# Patient Record
Sex: MALE | Race: ASIAN | NOT HISPANIC OR LATINO | Employment: OTHER | URBAN - METROPOLITAN AREA
[De-identification: names, ages, dates, MRNs, and addresses within clinical notes are randomized per-mention and may not be internally consistent; named-entity substitution may affect disease eponyms.]

---

## 2018-01-02 ENCOUNTER — OFFICE VISIT (OUTPATIENT)
Dept: URGENT CARE | Facility: CLINIC | Age: 73
End: 2018-01-02
Payer: MEDICARE

## 2018-01-02 VITALS
HEIGHT: 66 IN | TEMPERATURE: 97 F | SYSTOLIC BLOOD PRESSURE: 138 MMHG | BODY MASS INDEX: 20.89 KG/M2 | WEIGHT: 130 LBS | OXYGEN SATURATION: 97 % | DIASTOLIC BLOOD PRESSURE: 88 MMHG | HEART RATE: 76 BPM

## 2018-01-02 DIAGNOSIS — J06.9 UPPER RESPIRATORY TRACT INFECTION, UNSPECIFIED TYPE: ICD-10-CM

## 2018-01-02 DIAGNOSIS — R52 BODY ACHES: Primary | ICD-10-CM

## 2018-01-02 LAB
CTP QC/QA: YES
FLUAV AG NPH QL: NEGATIVE
FLUBV AG NPH QL: NEGATIVE

## 2018-01-02 PROCEDURE — 99213 OFFICE O/P EST LOW 20 MIN: CPT | Mod: 25,S$GLB,, | Performed by: FAMILY MEDICINE

## 2018-01-02 PROCEDURE — 87804 INFLUENZA ASSAY W/OPTIC: CPT | Mod: QW,S$GLB,, | Performed by: FAMILY MEDICINE

## 2018-01-02 NOTE — PROGRESS NOTES
"Subjective:       Patient ID: Maggie Bowen is a 73 y.o. male.    Vitals:  height is 5' 6" (1.676 m) and weight is 59 kg (130 lb). His temperature is 97 °F (36.1 °C). His blood pressure is 138/88 and his pulse is 76. His oxygen saturation is 97%.     Chief Complaint: URI    72 yo male with 4 day history of malaise and cough. No fever. No headache at present. Cough is mostly nonproductive.  No colored drainage.  Nonsmoker. No history of asthma or reactive airways      URI    This is a new problem. The current episode started in the past 7 days. The problem has been unchanged. There has been no fever. Associated symptoms include congestion, coughing, ear pain, headaches and a sore throat. Pertinent negatives include no abdominal pain, chest pain, nausea or wheezing. Treatments tried: otc cold & flu. The treatment provided no relief.     Review of Systems   Constitution: Positive for malaise/fatigue. Negative for chills and fever.   HENT: Positive for congestion, ear pain and sore throat. Negative for hoarse voice.    Eyes: Negative for discharge and redness.   Cardiovascular: Negative for chest pain, dyspnea on exertion and leg swelling.        Chest congestion   Respiratory: Positive for cough. Negative for shortness of breath, sputum production and wheezing.    Musculoskeletal: Positive for myalgias.   Gastrointestinal: Negative for abdominal pain and nausea.   Neurological: Positive for headaches.       Objective:      Physical Exam   Constitutional: He is oriented to person, place, and time. He appears well-developed and well-nourished. He is cooperative.  Non-toxic appearance. He does not appear ill. No distress.   HENT:   Head: Normocephalic and atraumatic.   Right Ear: Hearing, tympanic membrane, external ear and ear canal normal.   Left Ear: Hearing, tympanic membrane, external ear and ear canal normal.   Nose: Nose normal. No mucosal edema, rhinorrhea or nasal deformity. No epistaxis. Right sinus exhibits no " maxillary sinus tenderness and no frontal sinus tenderness. Left sinus exhibits no maxillary sinus tenderness and no frontal sinus tenderness.   Mouth/Throat: Uvula is midline, oropharynx is clear and moist and mucous membranes are normal. No trismus in the jaw. Normal dentition. No uvula swelling. No posterior oropharyngeal erythema.   Eyes: Conjunctivae and lids are normal. No scleral icterus.   Sclera clear bilat   Neck: Trachea normal, full passive range of motion without pain and phonation normal. Neck supple.   Cardiovascular: Normal rate, regular rhythm, normal heart sounds, intact distal pulses and normal pulses.    Pulmonary/Chest: Effort normal and breath sounds normal. No respiratory distress. He has no wheezes. He has no rales.   Abdominal: Soft. Normal appearance and bowel sounds are normal. He exhibits no distension and no mass. There is no tenderness. There is no guarding.   Musculoskeletal: Normal range of motion. He exhibits no edema or deformity.   Neurological: He is alert and oriented to person, place, and time. He exhibits normal muscle tone. Coordination normal.   Skin: Skin is warm, dry and intact. He is not diaphoretic. No pallor.   Psychiatric: He has a normal mood and affect. His speech is normal and behavior is normal. Judgment and thought content normal. Cognition and memory are normal.   Nursing note and vitals reviewed.      Assessment:       1. Body aches    2. Upper respiratory tract infection, unspecified type        Plan:         Body aches  -     POCT Influenza A/B    Upper respiratory tract infection, unspecified type    Rest, Fluids, Contagion precautions. Plain Robitussin OTC  Reviewed no antibiotic indicated at this time.    Neg flu test.

## 2018-01-02 NOTE — PATIENT INSTRUCTIONS
B?nh ???ng Hô H?p Do Vi Rút Gây Nên Có Ho Khò Khè  [Uri, Viral Respiratory Illness, Adult, No Abx]  Quý v? b? B?nh ? ??ng Hô H?p ph?nTrên [Upper Respiratory Illness (URI)] do vi rút gây nên. B?nh này lây truy?n marivel m?t vài ngày ??u. Nó britany t?a marivel khôn g khí do c?n ho và h?t h?i ho?c uyen ti?p xúc tr?c ti?p (??ng vào ng??i b?nh và andrea ?ó ??ng vào m?t, m?i ho?c mi?ng c?a chính m ình). Khi keyshawn?m trùng gây nên keyshawn?u kích thích, các ???ng th? (air passages) có th? b? co th?t. ?i?u này gây nên ho kh ò khè và h ?i th? ng?n.    Ph?n l?n b?nh do vi rút gây ???c gi?i valeriy?t marivel v òng t? 7 ??n 10 ngày n?u ngh? ng?i và dùng các bi?n pháp ch?a tr? ??n gi?n t?i tata; m?c d?u ?ôi khi b?nh có th? kéo dài keyshawn?u tu?n. Tr? sinh s? không di?t ???c vi rút và th??ng không ???c cho dùng ?? ch?a tr? b?nh này.  Ch?m Sóc T?i Tata:  1) N?u các tri?u ch?ng tr?m tr?ng, hãy ngh? ng ?i t?i nhà marivel 2 ??n 3 ngày ??u. Khi qu ý v? ho?t ??ng tr? l?i, ??ng ?? cho quá m?t.  2) Tránh ti?p xúc v?i khói thu?c lá (c?a quý v? ho?c c?a ng ??i khác).  3) Quý v? có th? dùng acetaminophen (Tylenol) ho?c ibuprofen (Motrin, Advil) ?? ki?m ch? c?n s?t hay ?au, tr? khi ???c ch? ??nh m?t lo?i thu?c khác ?? ch?a tr?. [ L?UÝ : N?u quý v? b? b?nh joaquim ho?c th?n mãn tính ho?c t?ng b? loét juan t? (stomach ulcer) ho?c barrera?t austin?t ???ng tiêu hóa (GI bleeding), h ãy bàn v?i bác s? c?a quý v? tr ??c khi dùng các lo?i thu?c này.] (??ng juan gi? dùng aspirin cho b?t c? tr? nào d??i 18 tu?i b? khi b?nh kèm jhonathan c?n s?t. ?i?u này có th? gây t?n h? i tr?m tr?ng cho joaquim.)  4) Quý v ? có th? b? kém ?n, do ?ó m?t ch? ?? ?n nh? là t?t. Tránh ??ng cho m?t n??c b?ng cách u?ng t? 6 ??n 8 ly ch?t l?ng m?i ngày (n??c, n??c ng?t, n??c ép trái cây, trà, súp v.v). Dùng thêm ch?t l?ng s? giúp làm l?ng ch?t bài ti?t marivel m?i và ph?i.  5) Các lo ?i thu?c c?m bán không c?n toa s? không rút ng?n th?i meera b? b?nh nh?ng có th? có ích ??i v?i các tri?u ch?ng andrea ?ây: ho (Robitussin DM); viêm  h?ng (viên ng?m ho?c b?m phun Chloraseptic); ngh?t m?i và xoang (Actifed ho?c Sudafed). [L?U Ý: ??ng d ùng thu?c thông m?i n?u quý v? b? mcbride austin?t áp.]  Ti?p T?c Rommel Dõi  cùng v?i bác s? c?a quý v? ho?c rommel nh?ng gì ? ã ???c h??ng d?n n?u t ình tr?ng c?a quý v? không ? ? h?n marivel tu?n k? ti?p.  N?u x?y ra b?t c? ?i?u nào andrea ?ây hãy L?P T?C ?I?U TR? Y T?:  -- Ho có keyshawn?u ?àm có màu (d?ch nh?y) ho?c có máu marivel ?àm  -- ?au ng?c, h?i th? ng?n, th? kh ò khè ho?c khó th?  -- ?au ??u, m?t, c?, h?ng ho?c albert tr?m tr?ng  -- S?t trên 100.5º quá ba ngày  -- Không th? nu?t vì ?au h?ng  Date Last Reviewed: 9/13/2015  © 2741-2077 [employer]. [address] [place] [address], [place] [address]. All rights reserved. This information is not intended as a substitute for professional medical care. Always follow your healthcare professional's instructions.      Rest, Fluids, Contagion precautions.  Recheck if not improving or condition worsens.  Plain Robitussin OTC

## 2021-01-03 ENCOUNTER — HOSPITAL ENCOUNTER (EMERGENCY)
Facility: HOSPITAL | Age: 76
Discharge: HOME | End: 2021-01-03
Attending: EMERGENCY MEDICINE
Payer: MEDICARE

## 2021-01-03 VITALS
DIASTOLIC BLOOD PRESSURE: 99 MMHG | BODY MASS INDEX: 21.66 KG/M2 | SYSTOLIC BLOOD PRESSURE: 169 MMHG | TEMPERATURE: 98 F | RESPIRATION RATE: 19 BRPM | OXYGEN SATURATION: 96 % | WEIGHT: 130 LBS | HEIGHT: 65 IN | HEART RATE: 67 BPM

## 2021-01-03 DIAGNOSIS — R42 DIZZINESS: ICD-10-CM

## 2021-01-03 DIAGNOSIS — R11.2 NAUSEA AND VOMITING, INTRACTABILITY OF VOMITING NOT SPECIFIED, UNSPECIFIED VOMITING TYPE: Primary | ICD-10-CM

## 2021-01-03 DIAGNOSIS — S13.9XXA NECK SPRAIN, INITIAL ENCOUNTER: ICD-10-CM

## 2021-01-03 LAB
ALBUMIN SERPL-MCNC: 3.9 G/DL (ref 3.3–5.5)
ALBUMIN SERPL-MCNC: 4.1 G/DL (ref 3.3–5.5)
ALP SERPL-CCNC: 45 U/L (ref 42–141)
ALP SERPL-CCNC: 54 U/L (ref 42–141)
BILIRUB SERPL-MCNC: 0.9 MG/DL (ref 0.2–1.6)
BILIRUB SERPL-MCNC: 1 MG/DL (ref 0.2–1.6)
BILIRUBIN, POC UA: NEGATIVE
BLOOD, POC UA: NEGATIVE
BUN SERPL-MCNC: 12 MG/DL (ref 7–22)
CALCIUM SERPL-MCNC: 9.5 MG/DL (ref 8–10.3)
CHLORIDE SERPL-SCNC: 106 MMOL/L (ref 98–108)
CLARITY, POC UA: CLEAR
COLOR, POC UA: YELLOW
CREAT SERPL-MCNC: 0.8 MG/DL (ref 0.6–1.2)
CTP QC/QA: YES
GLUCOSE SERPL-MCNC: 111 MG/DL (ref 73–118)
GLUCOSE, POC UA: NEGATIVE
KETONES, POC UA: NEGATIVE
LEUKOCYTE EST, POC UA: NEGATIVE
NITRITE, POC UA: NEGATIVE
PH UR STRIP: 7.5 [PH]
POC ALT (SGPT): 43 U/L (ref 10–47)
POC ALT (SGPT): 47 U/L (ref 10–47)
POC AMYLASE: 72 U/L (ref 14–97)
POC AST (SGOT): 39 U/L (ref 11–38)
POC AST (SGOT): 40 U/L (ref 11–38)
POC GGT: 28 U/L (ref 5–65)
POC TCO2: 29 MMOL/L (ref 18–33)
POTASSIUM BLD-SCNC: 3.7 MMOL/L (ref 3.6–5.1)
PROTEIN, POC UA: NEGATIVE
PROTEIN, POC: 7.5 G/DL (ref 6.4–8.1)
PROTEIN, POC: 7.5 G/DL (ref 6.4–8.1)
SARS-COV-2 RDRP RESP QL NAA+PROBE: NEGATIVE
SODIUM BLD-SCNC: 146 MMOL/L (ref 128–145)
SPECIFIC GRAVITY, POC UA: 1.02
UROBILINOGEN, POC UA: 0.2 E.U./DL

## 2021-01-03 PROCEDURE — 63600175 PHARM REV CODE 636 W HCPCS: Mod: ER | Performed by: NURSE PRACTITIONER

## 2021-01-03 PROCEDURE — 80053 COMPREHEN METABOLIC PANEL: CPT | Mod: ER

## 2021-01-03 PROCEDURE — 99285 EMERGENCY DEPT VISIT HI MDM: CPT | Mod: 25,ER

## 2021-01-03 PROCEDURE — 85025 COMPLETE CBC W/AUTO DIFF WBC: CPT | Mod: ER

## 2021-01-03 PROCEDURE — 81003 URINALYSIS AUTO W/O SCOPE: CPT | Mod: ER

## 2021-01-03 PROCEDURE — 96361 HYDRATE IV INFUSION ADD-ON: CPT | Mod: ER

## 2021-01-03 PROCEDURE — U0002 COVID-19 LAB TEST NON-CDC: HCPCS | Mod: ER | Performed by: NURSE PRACTITIONER

## 2021-01-03 PROCEDURE — 25000003 PHARM REV CODE 250: Mod: ER | Performed by: NURSE PRACTITIONER

## 2021-01-03 PROCEDURE — 96374 THER/PROPH/DIAG INJ IV PUSH: CPT | Mod: ER

## 2021-01-03 PROCEDURE — 82150 ASSAY OF AMYLASE: CPT | Mod: ER

## 2021-01-03 PROCEDURE — 96376 TX/PRO/DX INJ SAME DRUG ADON: CPT | Mod: ER

## 2021-01-03 RX ORDER — ONDANSETRON 4 MG/1
4 TABLET, ORALLY DISINTEGRATING ORAL EVERY 8 HOURS PRN
Qty: 30 TABLET | Refills: 0 | Status: SHIPPED | OUTPATIENT
Start: 2021-01-03

## 2021-01-03 RX ORDER — ACETAMINOPHEN 500 MG
1000 TABLET ORAL EVERY 6 HOURS PRN
Qty: 30 TABLET | Refills: 0 | Status: SHIPPED | OUTPATIENT
Start: 2021-01-03

## 2021-01-03 RX ORDER — SODIUM CHLORIDE 9 MG/ML
INJECTION, SOLUTION INTRAVENOUS
Status: COMPLETED | OUTPATIENT
Start: 2021-01-03 | End: 2021-01-03

## 2021-01-03 RX ORDER — ONDANSETRON 2 MG/ML
4 INJECTION INTRAMUSCULAR; INTRAVENOUS
Status: COMPLETED | OUTPATIENT
Start: 2021-01-03 | End: 2021-01-03

## 2021-01-03 RX ORDER — ACETAMINOPHEN 325 MG/1
650 TABLET ORAL ONCE
Status: COMPLETED | OUTPATIENT
Start: 2021-01-03 | End: 2021-01-03

## 2021-01-03 RX ADMIN — SODIUM CHLORIDE 500 ML/HR: 0.9 INJECTION, SOLUTION INTRAVENOUS at 07:01

## 2021-01-03 RX ADMIN — ONDANSETRON 4 MG: 2 INJECTION INTRAMUSCULAR; INTRAVENOUS at 05:01

## 2021-01-03 RX ADMIN — SODIUM CHLORIDE 500 ML/HR: 0.9 INJECTION, SOLUTION INTRAVENOUS at 04:01

## 2021-01-03 RX ADMIN — ACETAMINOPHEN 650 MG: 325 TABLET ORAL at 06:01

## 2021-01-03 RX ADMIN — ONDANSETRON 4 MG: 2 INJECTION INTRAMUSCULAR; INTRAVENOUS at 03:01

## 2021-01-22 ENCOUNTER — IMMUNIZATION (OUTPATIENT)
Dept: PHARMACY | Facility: CLINIC | Age: 76
End: 2021-01-22

## 2021-01-22 DIAGNOSIS — Z23 NEED FOR VACCINATION: Primary | ICD-10-CM

## 2021-02-19 ENCOUNTER — IMMUNIZATION (OUTPATIENT)
Dept: PHARMACY | Facility: CLINIC | Age: 76
End: 2021-02-19

## 2021-02-19 DIAGNOSIS — Z23 NEED FOR VACCINATION: Primary | ICD-10-CM

## 2024-09-03 ENCOUNTER — HOSPITAL ENCOUNTER (OUTPATIENT)
Facility: HOSPITAL | Age: 79
Discharge: HOME | End: 2024-09-04
Attending: EMERGENCY MEDICINE | Admitting: INTERNAL MEDICINE
Payer: MEDICARE

## 2024-09-03 DIAGNOSIS — R29.818 ACUTE FOCAL NEUROLOGICAL DEFICIT: ICD-10-CM

## 2024-09-03 DIAGNOSIS — I10 ESSENTIAL HYPERTENSION: ICD-10-CM

## 2024-09-03 DIAGNOSIS — R93.0 ABNORMAL CT SCAN OF HEAD: ICD-10-CM

## 2024-09-03 DIAGNOSIS — R90.89 ABNORMAL CT OF BRAIN: ICD-10-CM

## 2024-09-03 DIAGNOSIS — R42 DIZZINESS: Primary | ICD-10-CM

## 2024-09-03 LAB
ABO + RH BLD: NORMAL
ALBUMIN SERPL-MCNC: 3.7 G/DL (ref 3.3–5.5)
ALLENS TEST: ABNORMAL
ALP SERPL-CCNC: 53 U/L (ref 42–141)
ANION GAP SERPL CALC-SCNC: 10 MMOL/L (ref 8–16)
APTT PPP: 29.2 SEC (ref 21–32)
BASOPHILS # BLD AUTO: 0.05 K/UL (ref 0–0.2)
BASOPHILS NFR BLD: 0.6 % (ref 0–1.9)
BILIRUB SERPL-MCNC: 0.8 MG/DL (ref 0.2–1.6)
BLD GP AB SCN CELLS X3 SERPL QL: NORMAL
BUN SERPL-MCNC: 9 MG/DL (ref 7–22)
BUN SERPL-MCNC: 9 MG/DL (ref 8–23)
CALCIUM SERPL-MCNC: 9.3 MG/DL (ref 8–10.3)
CALCIUM SERPL-MCNC: 9.5 MG/DL (ref 8.7–10.5)
CHLORIDE SERPL-SCNC: 106 MMOL/L (ref 95–110)
CHLORIDE SERPL-SCNC: 108 MMOL/L (ref 98–108)
CO2 SERPL-SCNC: 23 MMOL/L (ref 23–29)
CREAT SERPL-MCNC: 0.8 MG/DL (ref 0.5–1.4)
CREAT SERPL-MCNC: 0.8 MG/DL (ref 0.6–1.2)
DIFFERENTIAL METHOD BLD: NORMAL
EOSINOPHIL # BLD AUTO: 0.1 K/UL (ref 0–0.5)
EOSINOPHIL NFR BLD: 1.2 % (ref 0–8)
ERYTHROCYTE [DISTWIDTH] IN BLOOD BY AUTOMATED COUNT: 13.1 % (ref 11.5–14.5)
EST. GFR  (NO RACE VARIABLE): >60 ML/MIN/1.73 M^2
GLUCOSE SERPL-MCNC: 108 MG/DL (ref 70–110)
GLUCOSE SERPL-MCNC: 114 MG/DL (ref 73–118)
HCO3 UR-SCNC: 26.3 MMOL/L (ref 24–28)
HCT VFR BLD AUTO: 49 % (ref 40–54)
HCT, POC: NORMAL
HGB BLD-MCNC: 16.8 G/DL (ref 14–18)
HGB, POC: NORMAL (ref 14–18)
IMM GRANULOCYTES # BLD AUTO: 0.02 K/UL (ref 0–0.04)
IMM GRANULOCYTES NFR BLD AUTO: 0.2 % (ref 0–0.5)
INR PPP: 1 (ref 0.8–1.2)
LDH SERPL L TO P-CCNC: 1.1 MMOL/L (ref 0.5–2.2)
LYMPHOCYTES # BLD AUTO: 3.4 K/UL (ref 1–4.8)
LYMPHOCYTES NFR BLD: 38.8 % (ref 18–48)
MAGNESIUM SERPL-MCNC: 2.3 MG/DL (ref 1.6–2.6)
MCH RBC QN AUTO: 30.7 PG (ref 27–31)
MCH, POC: NORMAL
MCHC RBC AUTO-ENTMCNC: 34.3 G/DL (ref 32–36)
MCHC, POC: NORMAL
MCV RBC AUTO: 89 FL (ref 82–98)
MCV, POC: NORMAL
MONOCYTES # BLD AUTO: 0.7 K/UL (ref 0.3–1)
MONOCYTES NFR BLD: 8.1 % (ref 4–15)
MPV, POC: NORMAL
NEUTROPHILS # BLD AUTO: 4.5 K/UL (ref 1.8–7.7)
NEUTROPHILS NFR BLD: 51.1 % (ref 38–73)
NRBC BLD-RTO: 0 /100 WBC
OHS QRS DURATION: 132 MS
OHS QTC CALCULATION: 401 MS
PCO2 BLDA: 42.9 MMHG (ref 35–45)
PH SMN: 7.39 [PH] (ref 7.35–7.45)
PHOSPHATE SERPL-MCNC: 2.6 MG/DL (ref 2.7–4.5)
PLATELET # BLD AUTO: 252 K/UL (ref 150–450)
PMV BLD AUTO: 9.2 FL (ref 9.2–12.9)
PO2 BLDA: 76 MMHG (ref 40–60)
POC ALT (SGPT): 30 U/L (ref 10–47)
POC AST (SGOT): 34 U/L (ref 11–38)
POC BE: 1 MMOL/L
POC PLATELET COUNT: NORMAL
POC PTINR: 1.1 (ref 0.9–1.2)
POC PTWBT: 13.1 SEC (ref 9.7–14.3)
POC SATURATED O2: 95 % (ref 95–100)
POC TCO2: 28 MMOL/L (ref 24–29)
POC TCO2: 29 MMOL/L (ref 18–33)
POCT GLUCOSE: 109 MG/DL (ref 70–110)
POTASSIUM BLD-SCNC: 4.1 MMOL/L (ref 3.6–5.1)
POTASSIUM SERPL-SCNC: 3.7 MMOL/L (ref 3.5–5.1)
PROTEIN, POC: 8 G/DL (ref 6.4–8.1)
PROTHROMBIN TIME: 10.5 SEC (ref 9–12.5)
RBC # BLD AUTO: 5.48 M/UL (ref 4.6–6.2)
RBC, POC: NORMAL
RDW, POC: NORMAL
SAMPLE: ABNORMAL
SAMPLE: NORMAL
SITE: ABNORMAL
SODIUM BLD-SCNC: 143 MMOL/L (ref 128–145)
SODIUM SERPL-SCNC: 139 MMOL/L (ref 136–145)
SPECIMEN OUTDATE: NORMAL
TSH SERPL DL<=0.005 MIU/L-ACNC: 0.52 UIU/ML (ref 0.4–4)
WBC # BLD AUTO: 8.84 K/UL (ref 3.9–12.7)
WBC, POC: NORMAL

## 2024-09-03 PROCEDURE — 85610 PROTHROMBIN TIME: CPT | Performed by: INTERNAL MEDICINE

## 2024-09-03 PROCEDURE — 86850 RBC ANTIBODY SCREEN: CPT | Performed by: INTERNAL MEDICINE

## 2024-09-03 PROCEDURE — 83735 ASSAY OF MAGNESIUM: CPT | Performed by: INTERNAL MEDICINE

## 2024-09-03 PROCEDURE — 86900 BLOOD TYPING SEROLOGIC ABO: CPT | Performed by: INTERNAL MEDICINE

## 2024-09-03 PROCEDURE — 93010 ELECTROCARDIOGRAM REPORT: CPT | Mod: ,,, | Performed by: INTERNAL MEDICINE

## 2024-09-03 PROCEDURE — 36415 COLL VENOUS BLD VENIPUNCTURE: CPT | Performed by: INTERNAL MEDICINE

## 2024-09-03 PROCEDURE — G0378 HOSPITAL OBSERVATION PER HR: HCPCS | Mod: ER

## 2024-09-03 PROCEDURE — 84443 ASSAY THYROID STIM HORMONE: CPT | Performed by: INTERNAL MEDICINE

## 2024-09-03 PROCEDURE — 93005 ELECTROCARDIOGRAM TRACING: CPT | Mod: ER

## 2024-09-03 PROCEDURE — 84100 ASSAY OF PHOSPHORUS: CPT | Performed by: INTERNAL MEDICINE

## 2024-09-03 PROCEDURE — 85730 THROMBOPLASTIN TIME PARTIAL: CPT | Performed by: INTERNAL MEDICINE

## 2024-09-03 PROCEDURE — 82803 BLOOD GASES ANY COMBINATION: CPT | Mod: ER

## 2024-09-03 PROCEDURE — 82607 VITAMIN B-12: CPT | Performed by: INTERNAL MEDICINE

## 2024-09-03 PROCEDURE — G0378 HOSPITAL OBSERVATION PER HR: HCPCS

## 2024-09-03 PROCEDURE — 85025 COMPLETE CBC W/AUTO DIFF WBC: CPT | Performed by: INTERNAL MEDICINE

## 2024-09-03 PROCEDURE — 25000003 PHARM REV CODE 250: Mod: ER | Performed by: EMERGENCY MEDICINE

## 2024-09-03 PROCEDURE — 80048 BASIC METABOLIC PNL TOTAL CA: CPT | Performed by: INTERNAL MEDICINE

## 2024-09-03 RX ORDER — ASPIRIN 81 MG/1
81 TABLET ORAL DAILY
Status: DISCONTINUED | OUTPATIENT
Start: 2024-09-04 | End: 2024-09-04 | Stop reason: HOSPADM

## 2024-09-03 RX ORDER — SODIUM CHLORIDE 0.9 % (FLUSH) 0.9 %
10 SYRINGE (ML) INJECTION
Status: DISCONTINUED | OUTPATIENT
Start: 2024-09-03 | End: 2024-09-04 | Stop reason: HOSPADM

## 2024-09-03 RX ORDER — GADOBUTROL 604.72 MG/ML
6 INJECTION INTRAVENOUS
Status: DISCONTINUED | OUTPATIENT
Start: 2024-09-04 | End: 2024-09-03

## 2024-09-03 RX ORDER — LABETALOL HYDROCHLORIDE 5 MG/ML
10 INJECTION, SOLUTION INTRAVENOUS
Status: DISCONTINUED | OUTPATIENT
Start: 2024-09-03 | End: 2024-09-04 | Stop reason: HOSPADM

## 2024-09-03 RX ORDER — ATORVASTATIN CALCIUM 40 MG/1
40 TABLET, FILM COATED ORAL NIGHTLY
Status: DISCONTINUED | OUTPATIENT
Start: 2024-09-03 | End: 2024-09-04 | Stop reason: HOSPADM

## 2024-09-03 RX ORDER — PANTOPRAZOLE SODIUM 40 MG/1
40 TABLET, DELAYED RELEASE ORAL DAILY
Status: DISCONTINUED | OUTPATIENT
Start: 2024-09-04 | End: 2024-09-04 | Stop reason: HOSPADM

## 2024-09-03 RX ORDER — BISACODYL 10 MG/1
10 SUPPOSITORY RECTAL DAILY PRN
Status: DISCONTINUED | OUTPATIENT
Start: 2024-09-03 | End: 2024-09-04 | Stop reason: HOSPADM

## 2024-09-03 RX ORDER — ONDANSETRON HYDROCHLORIDE 2 MG/ML
4 INJECTION, SOLUTION INTRAVENOUS EVERY 6 HOURS PRN
Status: DISCONTINUED | OUTPATIENT
Start: 2024-09-03 | End: 2024-09-04 | Stop reason: HOSPADM

## 2024-09-03 RX ORDER — POLYETHYLENE GLYCOL 3350 17 G/17G
17 POWDER, FOR SOLUTION ORAL DAILY
Status: DISCONTINUED | OUTPATIENT
Start: 2024-09-04 | End: 2024-09-04 | Stop reason: HOSPADM

## 2024-09-03 RX ORDER — ASPIRIN 325 MG
325 TABLET ORAL
Status: COMPLETED | OUTPATIENT
Start: 2024-09-03 | End: 2024-09-03

## 2024-09-03 RX ADMIN — ASPIRIN 325 MG ORAL TABLET 325 MG: 325 PILL ORAL at 03:09

## 2024-09-03 NOTE — SUBJECTIVE & OBJECTIVE
HPI:  79 y.o. male feeling dizzy and can't walk straight.     Imaging personally reviewed & interpreted:  CT Brain: no evidence of early or subacute ischemic changes, intracerebral hemorrhage or hyperdense vessel signs  CTA Head & Neck: Not performed at time of consultation  Current Outpatient Medications   Medication Instructions    acetaminophen (TYLENOL) 1,000 mg, Oral, Every 6 hours PRN    amLODIPine (NORVASC) 5 mg, Oral, Daily    atorvastatin (LIPITOR) 20 mg, Oral, Daily    ibuprofen (ADVIL,MOTRIN) 400 mg, Oral, Every 6 hours PRN    metoclopramide HCl (REGLAN) 10 mg, Oral, Every 6 hours    omeprazole (PRILOSEC) 20 mg, Oral, Daily    ondansetron (ZOFRAN) 4 mg, Oral, Every 6 hours    ondansetron (ZOFRAN-ODT) 4 mg, Oral, Every 8 hours PRN     Past Medical History:   Diagnosis Date    Elevated cholesterol     on medication    Hypertension     Reflux     on medication       Assessment and plan:  Dizziness - question of unsteady gate. Patient w/ NIHSS 0, transferring and ambulating without difficulty. No clear focal signs consistent with stroke, although isolated vertigo / acute vestibular syndrome could be a sign of stroke - however this patient not exhibiting clear signs and symptoms of acute vestibular syndrome.  On exam he is nonfocal - not sure if symptoms have resolved. Patient only c/o head feeling heavy..  OK to start asa 81 until workup completed    Lytics recommendation: Thrombolytic therapy not recommended due to Suspected stroke mimic  and Mild Non-Disabling Symptoms  Thrombectomy recommendation: No; at this time symptoms not suggestive of large vessel occlusion  Placement recommendation: pending further studies

## 2024-09-03 NOTE — TELEMEDICINE CONSULT
"[facility]  Vascular Neurology  Comprehensive Stroke Center  TeleVascular Neurology Acute Consultation Note        Consult Information  Consult to Telemedicine - Acute Stroke  Consult performed by: Nigel Holt MD  Consult ordered by: Anu Sandhu MD          Consulting Provider: ANU SANDHU   Current Providers  No providers found    Patient Location:  [facility] EMERGENCY DEPARTMENT Emergency Department    Spoke hospital nurse at bedside with patient assisting consultant.  Patient information was obtained from patient and relative(s).       Stroke Documentation  Acute Stroke Times   Last Known Normal Date: 09/03/24  Last Known Normal Time: 1140  Stroke Team Called Time: 1338  Stroke Team Arrival Time: 1348  Thrombolytic Therapy Recommended: No  Thrombectomy Recommended: No    NIH Scale:  1a. Level of Consciousness: 0-->Alert, keenly responsive  1b. LOC Questions: 0-->Answers both questions correctly  1c. LOC Commands: 0-->Performs both tasks correctly  2. Best Gaze: 0-->Normal  3. Visual: 0-->No visual loss  4. Facial Palsy: 0-->Normal symmetrical movements  5a. Motor Arm, Left: 0-->No drift, limb holds 90 (or 45) degrees for full 10 secs  5b. Motor Arm, Right: 0-->No drift, limb holds 90 (or 45) degrees for full 10 secs  6a. Motor Leg, Left: 0-->No drift, leg holds 30 degree position for full 5 secs  6b. Motor Leg, Right: 0-->No drift, leg holds 30 degree position for full 5 secs  7. Limb Ataxia: 0-->Absent  8. Sensory: 0-->Normal, no sensory loss  9. Best Language: 0-->No aphasia, normal  10. Dysarthria: 0-->Normal  11. Extinction and Inattention (formerly Neglect): 0-->No abnormality  Total (NIH Stroke Scale): 0      Modified Meghna:    McDonald Coma Scale:     ABCD2 Score:    [id number] Score:    HAS -BLED Score:    ICH Score:    Hunt & Sharma Classification:      Blood pressure (!) 184/101, pulse 66, temperature 97.7 °F (36.5 °C), resp. rate 20, height 5' 3" (1.6 m), weight 56.7 kg (125 " lb), SpO2 98%.      In my opinion, this was a: Tier 1; VAN Stroke Assessment: Negative     Medical Decision Making  HPI:  79 y.o. male feeling dizzy and can't walk straight.     Imaging personally reviewed & interpreted:  CT Brain: no evidence of early or subacute ischemic changes, intracerebral hemorrhage or hyperdense vessel signs  CTA Head & Neck: Not performed at time of consultation  Current Outpatient Medications   Medication Instructions    acetaminophen (TYLENOL) 1,000 mg, Oral, Every 6 hours PRN    amLODIPine (NORVASC) 5 mg, Oral, Daily    atorvastatin (LIPITOR) 20 mg, Oral, Daily    ibuprofen (ADVIL,MOTRIN) 400 mg, Oral, Every 6 hours PRN    metoclopramide HCl (REGLAN) 10 mg, Oral, Every 6 hours    omeprazole (PRILOSEC) 20 mg, Oral, Daily    ondansetron (ZOFRAN) 4 mg, Oral, Every 6 hours    ondansetron (ZOFRAN-ODT) 4 mg, Oral, Every 8 hours PRN     Past Medical History:   Diagnosis Date    Elevated cholesterol     on medication    Hypertension     Reflux     on medication       Assessment and plan:  Dizziness - question of unsteady gate. Patient w/ NIHSS 0, transferring and ambulating without difficulty. No clear focal signs consistent with stroke, although isolated vertigo / acute vestibular syndrome could be a sign of stroke - however this patient not exhibiting clear signs and symptoms of acute vestibular syndrome.  On exam he is nonfocal - not sure if symptoms have resolved. Patient only c/o head feeling heavy..  OK to start asa 81 until workup completed    Lytics recommendation: Thrombolytic therapy not recommended due to Suspected stroke mimic  and Mild Non-Disabling Symptoms  Thrombectomy recommendation: No; at this time symptoms not suggestive of large vessel occlusion  Placement recommendation: pending further studies               ROS  Physical Exam  Past Medical History:   Diagnosis Date    Elevated cholesterol     on medication    Hypertension     Reflux     on medication     Past Surgical  History:   Procedure Laterality Date    TONGUE BIOPSY  Nov. 2014    (at [facility])     No family history on file.    Diagnoses  Problem Noted   Dizziness 9/3/2024       Nigel Holt MD      Emergent/Acute neurological consultation requested by spoke provider due to critical concerns for possible cerebrovascular event that could result in permanent loss of neurologic/bodily function, severe disability or death of this patient.  Immediate/timely evaluation by a highly prepared expert is paramount for optimal outcomes  High risk for neurological deterioration if not properly diagnosed  High risk for neurological deterioration if not treated promplty/as soon as possible  Complex diagnostic evaluation may be required (advanced imaging)  High risk treatment options (thrombolytics and/or thrombectomy)    Patient care was coordinated with spoke provider, including but not limted to    Discussing likely diagnosis/etiology of symptoms  Making recommendations for further diagnostic studies  Making recommendations for intravenous thrombolytics or other advanced therapies  Making recommendations for disposition (admission/transfer for higher level of care)

## 2024-09-03 NOTE — ED TRIAGE NOTES
Pt came to the er c/o a headache and dizziness x3 hours. Pt denies numbness, tingling, slurred speech, facial droop. Pt allegedly had an unsteady gait at home but does not at this time

## 2024-09-04 VITALS
HEIGHT: 63 IN | BODY MASS INDEX: 22.81 KG/M2 | DIASTOLIC BLOOD PRESSURE: 73 MMHG | OXYGEN SATURATION: 95 % | WEIGHT: 128.75 LBS | SYSTOLIC BLOOD PRESSURE: 118 MMHG | HEART RATE: 69 BPM | TEMPERATURE: 99 F | RESPIRATION RATE: 18 BRPM

## 2024-09-04 PROBLEM — I10 ESSENTIAL HYPERTENSION: Status: ACTIVE | Noted: 2024-09-04

## 2024-09-04 PROBLEM — R93.0 ABNORMAL CT SCAN OF HEAD: Status: ACTIVE | Noted: 2024-09-04

## 2024-09-04 PROBLEM — E78.49 OTHER HYPERLIPIDEMIA: Status: ACTIVE | Noted: 2024-09-04

## 2024-09-04 PROBLEM — R73.03 PRE-DIABETES: Status: ACTIVE | Noted: 2024-09-04

## 2024-09-04 LAB
ANION GAP SERPL CALC-SCNC: 8 MMOL/L (ref 8–16)
AORTIC ROOT ANNULUS: 3.6 CM
AORTIC VALVE CUSP SEPERATION: 2.02 CM
ASCENDING AORTA: 3.4 CM
AV INDEX (PROSTH): 0.98
AV MEAN GRADIENT: 2 MMHG
AV PEAK GRADIENT: 4 MMHG
AV VALVE AREA BY VELOCITY RATIO: 3.93 CM²
AV VALVE AREA: 4.26 CM²
AV VELOCITY RATIO: 0.91
BASOPHILS # BLD AUTO: 0.06 K/UL (ref 0–0.2)
BASOPHILS NFR BLD: 0.7 % (ref 0–1.9)
BSA FOR ECHO PROCEDURE: 1.61 M2
BUN SERPL-MCNC: 12 MG/DL (ref 8–23)
CALCIUM SERPL-MCNC: 9.3 MG/DL (ref 8.7–10.5)
CHLORIDE SERPL-SCNC: 106 MMOL/L (ref 95–110)
CO2 SERPL-SCNC: 26 MMOL/L (ref 23–29)
CREAT SERPL-MCNC: 0.8 MG/DL (ref 0.5–1.4)
CV ECHO LV RWT: 0.55 CM
DIFFERENTIAL METHOD BLD: ABNORMAL
DOP CALC AO PEAK VEL: 0.97 M/S
DOP CALC AO VTI: 17.8 CM
DOP CALC LVOT AREA: 4.3 CM2
DOP CALC LVOT DIAMETER: 2.35 CM
DOP CALC LVOT PEAK VEL: 0.88 M/S
DOP CALC LVOT STROKE VOLUME: 75.87 CM3
DOP CALCLVOT PEAK VEL VTI: 17.5 CM
E WAVE DECELERATION TIME: 276.78 MSEC
E/A RATIO: 0.7
E/E' RATIO: 9.57 M/S
ECHO LV POSTERIOR WALL: 0.93 CM (ref 0.6–1.1)
EOSINOPHIL # BLD AUTO: 0.1 K/UL (ref 0–0.5)
EOSINOPHIL NFR BLD: 1.6 % (ref 0–8)
ERYTHROCYTE [DISTWIDTH] IN BLOOD BY AUTOMATED COUNT: 13.2 % (ref 11.5–14.5)
EST. GFR  (NO RACE VARIABLE): >60 ML/MIN/1.73 M^2
ESTIMATED AVG GLUCOSE: 114 MG/DL (ref 68–131)
FRACTIONAL SHORTENING: 33 % (ref 28–44)
GLUCOSE SERPL-MCNC: 98 MG/DL (ref 70–110)
HBA1C MFR BLD: 5.6 % (ref 4–5.6)
HCT VFR BLD AUTO: 49.1 % (ref 40–54)
HGB BLD-MCNC: 16.3 G/DL (ref 14–18)
IMM GRANULOCYTES # BLD AUTO: 0.03 K/UL (ref 0–0.04)
IMM GRANULOCYTES NFR BLD AUTO: 0.4 % (ref 0–0.5)
INTERVENTRICULAR SEPTUM: 1.02 CM (ref 0.6–1.1)
IVC DIAMETER: 1.82 CM
IVRT: 125.59 MSEC
LA MAJOR: 3.92 CM
LA MINOR: 4.06 CM
LA WIDTH: 2.5 CM
LEFT ATRIUM SIZE: 2.71 CM
LEFT ATRIUM VOLUME INDEX: 14.4 ML/M2
LEFT ATRIUM VOLUME: 22.97 CM3
LEFT INTERNAL DIMENSION IN SYSTOLE: 2.27 CM (ref 2.1–4)
LEFT VENTRICLE DIASTOLIC VOLUME INDEX: 29.69 ML/M2
LEFT VENTRICLE DIASTOLIC VOLUME: 47.5 ML
LEFT VENTRICLE MASS INDEX: 60 G/M2
LEFT VENTRICLE SYSTOLIC VOLUME INDEX: 11 ML/M2
LEFT VENTRICLE SYSTOLIC VOLUME: 17.63 ML
LEFT VENTRICULAR INTERNAL DIMENSION IN DIASTOLE: 3.4 CM (ref 3.5–6)
LEFT VENTRICULAR MASS: 95.3 G
LV LATERAL E/E' RATIO: 8.38 M/S
LV SEPTAL E/E' RATIO: 11.17 M/S
LVED V (TEICH): 47.5 ML
LVES V (TEICH): 17.63 ML
LVOT MG: 1.81 MMHG
LVOT MV: 0.65 CM/S
LYMPHOCYTES # BLD AUTO: 2.8 K/UL (ref 1–4.8)
LYMPHOCYTES NFR BLD: 34.5 % (ref 18–48)
MCH RBC QN AUTO: 30.1 PG (ref 27–31)
MCHC RBC AUTO-ENTMCNC: 33.2 G/DL (ref 32–36)
MCV RBC AUTO: 91 FL (ref 82–98)
MONOCYTES # BLD AUTO: 0.8 K/UL (ref 0.3–1)
MONOCYTES NFR BLD: 10.1 % (ref 4–15)
MV PEAK A VEL: 0.96 M/S
MV PEAK E VEL: 0.67 M/S
MV STENOSIS PRESSURE HALF TIME: 80.27 MS
MV VALVE AREA P 1/2 METHOD: 2.74 CM2
NEUTROPHILS # BLD AUTO: 4.3 K/UL (ref 1.8–7.7)
NEUTROPHILS NFR BLD: 52.7 % (ref 38–73)
NRBC BLD-RTO: 0 /100 WBC
OHS CV RV/LV RATIO: 0.89 CM
PISA TR MAX VEL: 2.81 M/S
PLATELET # BLD AUTO: 225 K/UL (ref 150–450)
PMV BLD AUTO: 9 FL (ref 9.2–12.9)
POCT GLUCOSE: 109 MG/DL (ref 70–110)
POCT GLUCOSE: 111 MG/DL (ref 70–110)
POTASSIUM SERPL-SCNC: 4.3 MMOL/L (ref 3.5–5.1)
PULM VEIN S/D RATIO: 1.34
PV PEAK D VEL: 0.38 M/S
PV PEAK GRADIENT: 4 MMHG
PV PEAK S VEL: 0.51 M/S
PV PEAK VELOCITY: 0.98 M/S
RA MAJOR: 4.59 CM
RA PRESSURE ESTIMATED: 3 MMHG
RA WIDTH: 2.7 CM
RBC # BLD AUTO: 5.42 M/UL (ref 4.6–6.2)
RIGHT VENTRICULAR END-DIASTOLIC DIMENSION: 3.04 CM
RV TB RVSP: 6 MMHG
RV TISSUE DOPPLER FREE WALL SYSTOLIC VELOCITY 1 (APICAL 4 CHAMBER VIEW): 11.71 CM/S
SINUS: 3.68 CM
SODIUM SERPL-SCNC: 140 MMOL/L (ref 136–145)
STJ: 2.7 CM
TDI LATERAL: 0.08 M/S
TDI SEPTAL: 0.06 M/S
TDI: 0.07 M/S
TR MAX PG: 32 MMHG
TRICUSPID ANNULAR PLANE SYSTOLIC EXCURSION: 1.59 CM
TV REST PULMONARY ARTERY PRESSURE: 35 MMHG
VIT B12 SERPL-MCNC: 664 PG/ML (ref 210–950)
WBC # BLD AUTO: 8.21 K/UL (ref 3.9–12.7)
Z-SCORE OF LEFT VENTRICULAR DIMENSION IN END DIASTOLE: -2.92
Z-SCORE OF LEFT VENTRICULAR DIMENSION IN END SYSTOLE: -1.73

## 2024-09-04 PROCEDURE — 36415 COLL VENOUS BLD VENIPUNCTURE: CPT | Performed by: INTERNAL MEDICINE

## 2024-09-04 PROCEDURE — G0378 HOSPITAL OBSERVATION PER HR: HCPCS

## 2024-09-04 PROCEDURE — 85025 COMPLETE CBC W/AUTO DIFF WBC: CPT | Performed by: INTERNAL MEDICINE

## 2024-09-04 PROCEDURE — 80048 BASIC METABOLIC PNL TOTAL CA: CPT | Performed by: INTERNAL MEDICINE

## 2024-09-04 PROCEDURE — 99204 OFFICE O/P NEW MOD 45 MIN: CPT | Mod: ,,, | Performed by: OTOLARYNGOLOGY

## 2024-09-04 PROCEDURE — 25500020 PHARM REV CODE 255: Performed by: STUDENT IN AN ORGANIZED HEALTH CARE EDUCATION/TRAINING PROGRAM

## 2024-09-04 PROCEDURE — A9585 GADOBUTROL INJECTION: HCPCS | Performed by: STUDENT IN AN ORGANIZED HEALTH CARE EDUCATION/TRAINING PROGRAM

## 2024-09-04 PROCEDURE — 97165 OT EVAL LOW COMPLEX 30 MIN: CPT

## 2024-09-04 PROCEDURE — 25000003 PHARM REV CODE 250: Performed by: STUDENT IN AN ORGANIZED HEALTH CARE EDUCATION/TRAINING PROGRAM

## 2024-09-04 PROCEDURE — 25000003 PHARM REV CODE 250: Performed by: INTERNAL MEDICINE

## 2024-09-04 PROCEDURE — 63600175 PHARM REV CODE 636 W HCPCS: Performed by: INTERNAL MEDICINE

## 2024-09-04 PROCEDURE — 83036 HEMOGLOBIN GLYCOSYLATED A1C: CPT | Performed by: INTERNAL MEDICINE

## 2024-09-04 PROCEDURE — 97161 PT EVAL LOW COMPLEX 20 MIN: CPT

## 2024-09-04 RX ORDER — DIAZEPAM 5 MG/1
5 TABLET ORAL ONCE AS NEEDED
Status: COMPLETED | OUTPATIENT
Start: 2024-09-04 | End: 2024-09-04

## 2024-09-04 RX ORDER — ACETAMINOPHEN 325 MG/1
650 TABLET ORAL EVERY 6 HOURS PRN
Status: DISCONTINUED | OUTPATIENT
Start: 2024-09-04 | End: 2024-09-04 | Stop reason: HOSPADM

## 2024-09-04 RX ORDER — METOPROLOL TARTRATE 1 MG/ML
5 INJECTION, SOLUTION INTRAVENOUS ONCE
Status: DISCONTINUED | OUTPATIENT
Start: 2024-09-04 | End: 2024-09-04

## 2024-09-04 RX ORDER — BUTALBITAL, ACETAMINOPHEN AND CAFFEINE 50; 325; 40 MG/1; MG/1; MG/1
1 TABLET ORAL EVERY 6 HOURS PRN
Qty: 12 TABLET | Refills: 0 | Status: SHIPPED | OUTPATIENT
Start: 2024-09-04

## 2024-09-04 RX ORDER — GADOBUTROL 604.72 MG/ML
6 INJECTION INTRAVENOUS
Status: COMPLETED | OUTPATIENT
Start: 2024-09-04 | End: 2024-09-04

## 2024-09-04 RX ADMIN — GADOBUTROL 6 ML: 604.72 INJECTION INTRAVENOUS at 06:09

## 2024-09-04 RX ADMIN — ACETAMINOPHEN 650 MG: 325 TABLET ORAL at 12:09

## 2024-09-04 RX ADMIN — DIAZEPAM 5 MG: 5 TABLET ORAL at 04:09

## 2024-09-04 RX ADMIN — ASPIRIN 81 MG: 81 TABLET, COATED ORAL at 07:09

## 2024-09-04 RX ADMIN — ONDANSETRON 4 MG: 2 INJECTION INTRAMUSCULAR; INTRAVENOUS at 12:09

## 2024-09-04 RX ADMIN — PANTOPRAZOLE SODIUM 40 MG: 40 TABLET, DELAYED RELEASE ORAL at 07:09

## 2024-09-04 RX ADMIN — POTASSIUM PHOSPHATE, MONOBASIC 1000 MG: 500 TABLET, SOLUBLE ORAL at 12:09

## 2024-09-04 RX ADMIN — ATORVASTATIN CALCIUM 40 MG: 40 TABLET, FILM COATED ORAL at 12:09

## 2024-09-04 NOTE — HPI
"79 y.o. Kinyarwanda man with h/o Essential HTN, HLD, BPH, and pre-DM presents to the [facility] ED with h/o dizziness and NUGENT. States was fine yesterday but this am woke up with severe HA all over.  NO vision changes or focal neurological deficits. Family denies speech changes/impairments. NO facial droop. No c/o palpitations or chest pain/pressure. NO N/V/Diarrhea.     He was stoke activated with CT non-contrasted Impression:     1. No acute large vascular territory infarct or intracranial hemorrhage identified.  If persistent neurologic deficit, MRI brain can be obtained.  2. Similar changes of chronic microvascular ischemic disease with cerebral volume loss.  3. Right infratemporal fossa region plaque-like soft tissue density lesion as above, nonspecific.  Sequela of underlying malignancy including squamous cell carcinoma should be excluded.  Further evaluation/follow-up as warranted.  This report was flagged in Epic as containing an incidental finding.    Vascular neurology was consulted and per their recs:   "Dizziness - question of unsteady gate. Patient w/ NIHSS 0, transferring and ambulating without difficulty. No clear focal signs consistent with stroke, although isolated vertigo / acute vestibular syndrome could be a sign of stroke - however this patient not exhibiting clear signs and symptoms of acute vestibular syndrome."  Thrombolytics were not recommended due to suspected stroke mimic and Mild Non-Disabling Symptoms      He was transferred to [facility] for further work up of dizziness possible TIA.     Because this patient's clinical condition of dizziness is still persistent and requires further work up by neurology for dizzines and r/o CVA, care in an alternative location isn't clinically appropriate for the reasons stated above.     "

## 2024-09-04 NOTE — PLAN OF CARE
Problem: Physical Therapy  Goal: Physical Therapy Goal  Description: Goals to be met by: 24     Patient will increase functional independence with mobility by performin. Pt to be mod I with bed mobility.  2. Pt to transfer with (S).  3. Pt to ambulate 150' /s AD (S).  4. Pt to demonstrate good static/dynamic standing balance.    Outcome: Progressing   Initial eval completed, see in chart for details.

## 2024-09-04 NOTE — H&P
"  [facility]  History & Physical    Patient Name: Maggie Bowen  MRN: [id number]  Patient Class: OP- Observation  Admission Date: 9/3/2024  Attending Physician: Dr. Jazz Berg   Primary Care Provider: Clarice Keita MD         Patient information was obtained from patient, relative(s), past medical records, and ER records.     Subjective:     Principal Problem:Dizziness    Chief Complaint:   Chief Complaint   Patient presents with    Dizziness     Dizziness x3 hours. Also c/o headache. Pt's son reports dizzy spells in the past. Denies slurred speech, facial droop, weakness. Pt is in ct        HPI: 79 y.o. [place] man with h/o Essential HTN, HLD, BPH, and pre-DM presents to the [facility] ED with h/o dizziness and NUGENT. States was fine yesterday but this am woke up with severe HA all over.  NO vision changes or focal neurological deficits. Family denies speech changes/impairments. NO facial droop. No c/o palpitations or chest pain/pressure. NO N/V/Diarrhea.     He was stoke activated with CT non-contrasted Impression:     1. No acute large vascular territory infarct or intracranial hemorrhage identified.  If persistent neurologic deficit, MRI brain can be obtained.  2. Similar changes of chronic microvascular ischemic disease with cerebral volume loss.  3. Right infratemporal fossa region plaque-like soft tissue density lesion as above, nonspecific.  Sequela of underlying malignancy including squamous cell carcinoma should be excluded.  Further evaluation/follow-up as warranted.  This report was flagged in Epic as containing an incidental finding.    Vascular neurology was consulted and per their recs:   "Dizziness - question of unsteady gate. Patient w/ NIHSS 0, transferring and ambulating without difficulty. No clear focal signs consistent with stroke, although isolated vertigo / acute vestibular syndrome could be a sign of stroke - however this patient not exhibiting clear signs and " "symptoms of acute vestibular syndrome."  Thrombolytics were not recommended due to suspected stroke mimic and Mild Non-Disabling Symptoms      He was transferred to [facility] for further work up of dizziness possible TIA.     Because this patient's clinical condition of dizziness is still persistent and requires further work up by neurology for dizzines and r/o CVA, care in an alternative location isn't clinically appropriate for the reasons stated above.       Past Medical History:   Diagnosis Date    Elevated cholesterol     on medication    Hypertension     Reflux     on medication       Past Surgical History:   Procedure Laterality Date    TONGUE BIOPSY  Nov. 2014    (at [facility])       Review of patient's allergies indicates:   Allergen Reactions    Influenza virus vaccines        No current facility-administered medications on file prior to encounter.     Current Outpatient Medications on File Prior to Encounter   Medication Sig    acetaminophen (TYLENOL) 500 MG tablet Take 2 tablets (1,000 mg total) by mouth every 6 (six) hours as needed for Pain.    amlodipine (NORVASC) 5 MG tablet Take 5 mg by mouth once daily.    atorvastatin (LIPITOR) 20 MG tablet Take 20 mg by mouth once daily.    ibuprofen (ADVIL,MOTRIN) 200 MG tablet Take 2 tablets (400 mg total) by mouth every 6 (six) hours as needed for Pain.    metoclopramide HCl (REGLAN) 10 MG tablet Take 1 tablet (10 mg total) by mouth every 6 (six) hours.    omeprazole (PRILOSEC) 20 MG capsule Take 20 mg by mouth once daily.    ondansetron (ZOFRAN) 4 MG tablet Take 1 tablet (4 mg total) by mouth every 6 (six) hours.    ondansetron (ZOFRAN-ODT) 4 MG TbDL Take 1 tablet (4 mg total) by mouth every 8 (eight) hours as needed (nausea).     Family History    None       Tobacco Use    Smoking status: Former    Smokeless tobacco: Not on file   Substance and Sexual Activity    Alcohol use: No    Drug use: No    Sexual activity: Not on file     Review of Systems "   Constitutional:  Positive for activity change. Negative for appetite change (today due to dizziness and HA), chills, diaphoresis, fatigue, fever and unexpected weight change.   HENT:  Negative for congestion, rhinorrhea and sinus pressure.    Eyes:  Negative for visual disturbance.   Respiratory:  Negative for cough, chest tightness, shortness of breath and wheezing.    Cardiovascular:  Negative for chest pain, palpitations and leg swelling.   Gastrointestinal:  Positive for nausea (due to dizziness). Negative for abdominal pain, constipation, diarrhea and vomiting.   Genitourinary:  Negative for dysuria.   Musculoskeletal:  Negative for arthralgias, back pain, gait problem and myalgias.   Neurological:  Positive for dizziness, light-headedness and headaches. Negative for seizures, syncope and numbness.   Psychiatric/Behavioral:  Negative for confusion and dysphoric mood. The patient is not nervous/anxious.      Objective:     Vital Signs (Most Recent):  Temp: 97.7 °F (36.5 °C) (09/04/24 0335)  Pulse: 74 (09/04/24 0642)  Resp: 18 (09/04/24 0335)  BP: 111/76 (09/04/24 0335)  SpO2: 95 % (09/04/24 0500) Vital Signs (24h Range):  Temp:  [97.5 °F (36.4 °C)-98.2 °F (36.8 °C)] 97.7 °F (36.5 °C)  Pulse:  [58-74] 74  Resp:  [12-20] 18  SpO2:  [95 %-98 %] 95 %  BP: (111-184)/() 111/76     Weight: 58.4 kg (128 lb 12 oz)  Body mass index is 22.81 kg/m².     Physical Exam  Vitals and nursing note reviewed.   Constitutional:       General: He is not in acute distress.     Appearance: Normal appearance. He is not ill-appearing, toxic-appearing or diaphoretic.      Comments: Thin, lean appearing    HENT:      Head: Normocephalic and atraumatic.      Nose: Nose normal. No congestion or rhinorrhea.      Mouth/Throat:      Mouth: Mucous membranes are moist.      Pharynx: Oropharynx is clear. No oropharyngeal exudate or posterior oropharyngeal erythema.   Eyes:      General: No scleral icterus.     Extraocular Movements:  Extraocular movements intact.      Conjunctiva/sclera: Conjunctivae normal.      Pupils: Pupils are equal, round, and reactive to light.   Neck:      Vascular: No carotid bruit.   Cardiovascular:      Rate and Rhythm: Normal rate and regular rhythm.      Pulses: Normal pulses.      Heart sounds: No murmur heard.     No friction rub. No gallop.   Pulmonary:      Effort: Pulmonary effort is normal. No respiratory distress.      Breath sounds: No wheezing, rhonchi or rales.   Abdominal:      General: Abdomen is flat. There is no distension.      Palpations: Abdomen is soft.      Tenderness: There is no abdominal tenderness. There is no guarding or rebound.   Musculoskeletal:         General: No swelling. Normal range of motion.      Cervical back: Normal range of motion and neck supple.      Right lower leg: No edema.      Left lower leg: No edema.   Skin:     General: Skin is warm and dry.      Capillary Refill: Capillary refill takes less than 2 seconds.      Coloration: Skin is pale.   Neurological:      General: No focal deficit present.      Mental Status: He is alert and oriented to person, place, and time. Mental status is at baseline.      Cranial Nerves: No cranial nerve deficit.      Motor: No weakness.      Coordination: Coordination normal.   Psychiatric:         Mood and Affect: Mood normal.         Behavior: Behavior normal.              CRANIAL NERVES     CN III, IV, VI   Pupils are equal, round, and reactive to light.       Recent Results (from the past 24 hour(s))   POCT glucose    Collection Time: 09/03/24  1:39 PM   Result Value Ref Range    POCT Glucose 109 70 - 110 mg/dL   POCT CMP    Collection Time: 09/03/24  1:53 PM   Result Value Ref Range    Albumin, POC 3.7 3.3 - 5.5 g/dL    Alkaline Phosphatase, POC 53 42 - 141 U/L    ALT (SGPT), POC 30 10 - 47 U/L    AST (SGOT), POC 34 11 - 38 U/L    POC BUN 9 7 - 22 mg/dL    Calcium, POC 9.3 8.0 - 10.3 mg/dL    POC Chloride 108 98 - 108 mmol/L    POC  Creatinine 0.8 0.6 - 1.2 mg/dL    POC Glucose 114 73 - 118 mg/dL    POC Potassium 4.1 3.6 - 5.1 mmol/L    POC Sodium 143 128 - 145 mmol/L    Bilirubin, POC 0.8 0.2 - 1.6 mg/dL    POC TCO2 29 18 - 33 mmol/L    Protein, POC 8.0 6.4 - 8.1 g/dL   ECG 12 lead    Collection Time: 09/03/24  1:53 PM   Result Value Ref Range    QRS Duration 132 ms    [facility] QTC Calculation 401 ms   ISTAT PROCEDURE    Collection Time: 09/03/24  1:58 PM   Result Value Ref Range    POC PTWBT 13.1 9.7 - 14.3 sec    POC PTINR 1.1 0.9 - 1.2    Sample unknown    POCT CBC    Collection Time: 09/03/24  2:00 PM   Result Value Ref Range    Hematocrit      Hemoglobin      RBC      WBC      MCV      MCH, POC      MCHC      RDW-CV      Platelet Count, POC      MPV     ISTAT PROCEDURE    Collection Time: 09/03/24  2:00 PM   Result Value Ref Range    POC PH 7.395 7.35 - 7.45    POC PCO2 42.9 35 - 45 mmHg    POC PO2 76 (HH) 40 - 60 mmHg    POC HCO3 26.3 24 - 28 mmol/L    POC BE 1 -2 to 2 mmol/L    POC SATURATED O2 95 95 - 100 %    POC Lactate 1.10 0.5 - 2.2 mmol/L    POC TCO2 28 24 - 29 mmol/L    Sample VENOUS     Site Other     Allens Test N/A    Basic metabolic panel    Collection Time: 09/03/24  7:39 PM   Result Value Ref Range    Sodium 139 136 - 145 mmol/L    Potassium 3.7 3.5 - 5.1 mmol/L    Chloride 106 95 - 110 mmol/L    CO2 23 23 - 29 mmol/L    Glucose 108 70 - 110 mg/dL    BUN 9 8 - 23 mg/dL    Creatinine 0.8 0.5 - 1.4 mg/dL    Calcium 9.5 8.7 - 10.5 mg/dL    Anion Gap 10 8 - 16 mmol/L    eGFR >60 >60 mL/min/1.73 m^2   Magnesium    Collection Time: 09/03/24  7:39 PM   Result Value Ref Range    Magnesium 2.3 1.6 - 2.6 mg/dL   Phosphorus    Collection Time: 09/03/24  7:39 PM   Result Value Ref Range    Phosphorus 2.6 (L) 2.7 - 4.5 mg/dL   TSH    Collection Time: 09/03/24  7:39 PM   Result Value Ref Range    TSH 0.523 0.400 - 4.000 uIU/mL   Type & Screen    Collection Time: 09/03/24  7:39 PM   Result Value Ref Range    Group & Rh O POS     Indirect  Dima NEG     Specimen Outdate 09/06/2024 23:59    Protime-INR    Collection Time: 09/03/24  7:39 PM   Result Value Ref Range    Prothrombin Time 10.5 9.0 - 12.5 sec    INR 1.0 0.8 - 1.2   APTT    Collection Time: 09/03/24  7:39 PM   Result Value Ref Range    aPTT 29.2 21.0 - 32.0 sec   CBC Auto Differential    Collection Time: 09/03/24  7:39 PM   Result Value Ref Range    WBC 8.84 3.90 - 12.70 K/uL    RBC 5.48 4.60 - 6.20 M/uL    Hemoglobin 16.8 14.0 - 18.0 g/dL    Hematocrit 49.0 40.0 - 54.0 %    MCV 89 82 - 98 fL    MCH 30.7 27.0 - 31.0 pg    MCHC 34.3 32.0 - 36.0 g/dL    RDW 13.1 11.5 - 14.5 %    Platelets 252 150 - 450 K/uL    MPV 9.2 9.2 - 12.9 fL    Immature Granulocytes 0.2 0.0 - 0.5 %    Gran # (ANC) 4.5 1.8 - 7.7 K/uL    Immature Grans (Abs) 0.02 0.00 - 0.04 K/uL    Lymph # 3.4 1.0 - 4.8 K/uL    Mono # 0.7 0.3 - 1.0 K/uL    Eos # 0.1 0.0 - 0.5 K/uL    Baso # 0.05 0.00 - 0.20 K/uL    nRBC 0 0 /100 WBC    Gran % 51.1 38.0 - 73.0 %    Lymph % 38.8 18.0 - 48.0 %    Mono % 8.1 4.0 - 15.0 %    Eosinophil % 1.2 0.0 - 8.0 %    Basophil % 0.6 0.0 - 1.9 %    Differential Method Automated    Basic metabolic panel    Collection Time: 09/04/24  4:57 AM   Result Value Ref Range    Sodium 140 136 - 145 mmol/L    Potassium 4.3 3.5 - 5.1 mmol/L    Chloride 106 95 - 110 mmol/L    CO2 26 23 - 29 mmol/L    Glucose 98 70 - 110 mg/dL    BUN 12 8 - 23 mg/dL    Creatinine 0.8 0.5 - 1.4 mg/dL    Calcium 9.3 8.7 - 10.5 mg/dL    Anion Gap 8 8 - 16 mmol/L    eGFR >60 >60 mL/min/1.73 m^2   CBC Auto Differential    Collection Time: 09/04/24  4:57 AM   Result Value Ref Range    WBC 8.21 3.90 - 12.70 K/uL    RBC 5.42 4.60 - 6.20 M/uL    Hemoglobin 16.3 14.0 - 18.0 g/dL    Hematocrit 49.1 40.0 - 54.0 %    MCV 91 82 - 98 fL    MCH 30.1 27.0 - 31.0 pg    MCHC 33.2 32.0 - 36.0 g/dL    RDW 13.2 11.5 - 14.5 %    Platelets 225 150 - 450 K/uL    MPV 9.0 (L) 9.2 - 12.9 fL    Immature Granulocytes 0.4 0.0 - 0.5 %    Gran # (ANC) 4.3 1.8 - 7.7  K/uL    Immature Grans (Abs) 0.03 0.00 - 0.04 K/uL    Lymph # 2.8 1.0 - 4.8 K/uL    Mono # 0.8 0.3 - 1.0 K/uL    Eos # 0.1 0.0 - 0.5 K/uL    Baso # 0.06 0.00 - 0.20 K/uL    nRBC 0 0 /100 WBC    Gran % 52.7 38.0 - 73.0 %    Lymph % 34.5 18.0 - 48.0 %    Mono % 10.1 4.0 - 15.0 %    Eosinophil % 1.6 0.0 - 8.0 %    Basophil % 0.7 0.0 - 1.9 %    Differential Method Automated        Microbiology Results (last 7 days)       ** No results found for the last 168 hours. **            Imaging Results              X-Ray Chest AP Portable (Final result)  Result time 09/03/24 16:48:14      Final result by Nahid Rosa MD (09/03/24 16:48:14)                   Impression:      No radiographic acute intrathoracic process seen on this single view.      Electronically signed by: Nahid Rosa MD  Date:    09/03/2024  Time:    16:48               Narrative:    EXAMINATION:  XR CHEST AP PORTABLE    CLINICAL HISTORY:  Dizziness and giddiness    TECHNIQUE:  Single frontal view of the chest was performed.    COMPARISON:  None    FINDINGS:  Monitoring leads overlie the chest.  Patient is rotated.    Nonspecific elevation of the right hemidiaphragm.  Mediastinal structures are midline with calcification and tortuosity of the aorta.  Heart is not significantly enlarged.  Pulmonary vasculature and hilar contours are within normal limits.  Bibasilar mild platelike scarring versus atelectasis.  No consolidation, pleural effusion or pneumothorax.                                        CT Head Without Contrast (Final result)  Result time 09/03/24 13:52:24      Final result by Nahid Rosa MD (09/03/24 13:52:24)                   Impression:      1. No acute large vascular territory infarct or intracranial hemorrhage identified.  If persistent neurologic deficit, MRI brain can be obtained.  2. Similar changes of chronic microvascular ischemic disease with cerebral volume loss.  3. Right infratemporal fossa region plaque-like soft tissue  density lesion as above, nonspecific.  Sequela of underlying malignancy including squamous cell carcinoma should be excluded.  Further evaluation/follow-up as warranted.  This report was flagged in Epic as containing an incidental finding.      Electronically signed by: Nahid Rosa MD  Date:    09/03/2024  Time:    13:52               Narrative:    EXAMINATION:  CT HEAD WITHOUT CONTRAST    CLINICAL HISTORY:  Neuro deficit, acute, stroke suspected;    TECHNIQUE:  Low dose axial CT images obtained throughout the head without intravenous contrast. Sagittal and coronal reconstructions were performed.    COMPARISON:  Head CT 01/03/2021    FINDINGS:  Intracranial compartment:    Generalized cerebral volume loss.  Ventricles are midline and similar in overall size and configuration without distortion by mass effect or acute hydrocephalus.  No extra-axial blood or fluid collections.    Gross similar distribution of patchy hypoattenuation of the supratentorial white matter likely sequela of chronic microvascular ischemic change.  No definite new focal areas of abnormal parenchymal attenuation.  No parenchymal mass, hemorrhage, edema or major vascular distribution infarct.  Skull base atherosclerotic vascular calcifications noted.    Skull/extracranial contents (limited evaluation): No fracture.  Suspected small retention cyst versus polyp in the right maxillary sinus.  Mastoid air cells and remaining imaged paranasal sinuses are essentially clear.  Similar degenerative change at the left TMJ.  Suspected remote operative change of the bilateral ocular lenses.  At the posterior aspect of the right infratemporal fossa there is approximate 1.1 x 3.3 cm slightly heterogeneous soft tissue density mass primarily centered at the subcutaneous fat with extension to the overlying skin.  This is more conspicuous and larger from 01/03/2021 study.  No associated internal gas foci or walled-off fluid collection to suggest drainable  "abscess.                                        Assessment/Plan:     * Dizziness  F/u on MRI brain, PT/OT consulted and fall risk in place. F/u with neurology eval.       Abnormal CT scan of head  Noted on CT: "Right infratemporal fossa region plaque-like soft tissue density lesion as above, nonspecific.  Sequela of underlying malignancy including squamous cell carcinoma should be excluded." MRI brain ordered and will f/u. Will hold on anticoagulation till MRI brain results given symptoms not c/w stroke.     Essential hypertension  Chronic, controlled. Latest blood pressure and vitals reviewed-     Temp:  [97.5 °F (36.4 °C)-98.2 °F (36.8 °C)]   Pulse:  [58-74]   Resp:  [12-20]   BP: (111-184)/()   SpO2:  [95 %-98 %] .   Home meds for hypertension were reviewed and noted below.   Hypertension Medications               amlodipine (NORVASC) 5 MG tablet Take 5 mg by mouth once daily.            While in the hospital, will manage blood pressure as follows; Adjust home antihypertensive regimen as follows- permissive HTN until MRI brain r/o stroke then resume home med.     Will utilize p.r.n. blood pressure medication only if patient's blood pressure greater than 220/110 and he develops symptoms such as worsening chest pain or shortness of breath.    Other hyperlipidemia  F/u on FLP and resume statin. Further adjustments pending results.       Pre-diabetes    F/u on A1c. Further tx pending results. Noted elevated sugars and will adjust diet.       VTE Risk Mitigation (From admission, onward)           Ordered     Reason for No Pharmacological VTE Prophylaxis  Once        Question:  Reasons:  Answer:  Risk of Bleeding  Comment:  awaiting MRI brain r/o mass    09/03/24 2135     Place SERA hose  Until discontinued         09/03/24 2135     IP VTE HIGH RISK PATIENT  Once         09/03/24 2135     Place sequential compression device  Until discontinued         09/03/24 2135                       On 09/03/2024, patient " should be placed in hospital observation services under my care.    CODE STATUS: FULL CODE          Jazz Berg MD  Department of Hospital Medicine  [facility] [place] [facility]

## 2024-09-04 NOTE — CONSULTS
[facility]  Otorhinolaryngology-Head & Neck Surgery  Consult Note    Patient Name: Maggie Bowen  MRN: [id number]  Code Status: Full Code  Admission Date: 9/3/2024  Hospital Length of Stay: 0 days  Attending Physician: Joanne Mcclure DO  Primary Care Provider: Clarice Keita MD    Consults  Subjective:     Chief Complaint/Reason for Admission: dizziness    History of Present Illness: 79 year old male presented with dizziness and noted on ct head/mri to have lesion of temporal scalp region. Per notes from er available in epic patient described dizziness as a heavy sensation in his head and son had noted altered gait. He underwent workup for stroke per notes     Per chart review had partial right glossectomy done by ent Dr ch in ; path report in [facility] from 2014 state granulomatous with focal calcification suggestive of phlebolith. Per op note there was suspicion preoperatively for possible hemangioma. Records regarding this are limited.     He reports having had something removed from the right temple area a few years ago. He think smaybe it was a cyst. Has a keloid. Does not think the area has enlarged. Not paiful. No known history of skin cancer.     Medications:  Continuous Infusions:  Scheduled Meds:   aspirin  81 mg Oral Daily    atorvastatin  40 mg Oral QHS    pantoprazole  40 mg Oral Daily    polyethylene glycol  17 g Oral Daily     PRN Meds:  Current Facility-Administered Medications:     acetaminophen, 650 mg, Oral, Q6H PRN    bisacodyL, 10 mg, Rectal, Daily PRN    diazePAM, 5 mg, Oral, Once PRN    labetalol, 10 mg, Intravenous, Q15 Min PRN    ondansetron, 4 mg, Intravenous, Q6H PRN    sodium chloride 0.9%, 500 mL, Intravenous, PRN    sodium chloride 0.9%, 10 mL, Intravenous, PRN     No current facility-administered medications on file prior to encounter.     Current Outpatient Medications on File Prior to Encounter   Medication Sig    acetaminophen (TYLENOL) 500 MG tablet Take 2 tablets  (1,000 mg total) by mouth every 6 (six) hours as needed for Pain.    amlodipine (NORVASC) 5 MG tablet Take 5 mg by mouth once daily.    atorvastatin (LIPITOR) 20 MG tablet Take 20 mg by mouth once daily.    ibuprofen (ADVIL,MOTRIN) 200 MG tablet Take 2 tablets (400 mg total) by mouth every 6 (six) hours as needed for Pain.    metoclopramide HCl (REGLAN) 10 MG tablet Take 1 tablet (10 mg total) by mouth every 6 (six) hours.    omeprazole (PRILOSEC) 20 MG capsule Take 20 mg by mouth once daily.    ondansetron (ZOFRAN) 4 MG tablet Take 1 tablet (4 mg total) by mouth every 6 (six) hours.    ondansetron (ZOFRAN-ODT) 4 MG TbDL Take 1 tablet (4 mg total) by mouth every 8 (eight) hours as needed (nausea).       Review of patient's allergies indicates:   Allergen Reactions    Influenza virus vaccines        Past Medical History:   Diagnosis Date    Elevated cholesterol     on medication    Hypertension     Reflux     on medication     Past Surgical History:   Procedure Laterality Date    TONGUE BIOPSY  Nov. 2014    (at [facility])     Family History    None       Tobacco Use    Smoking status: Former    Smokeless tobacco: Not on file   Substance and Sexual Activity    Alcohol use: No    Drug use: No    Sexual activity: Not on file     Review of Systems   Constitutional:  Negative for fever.   HENT:  Negative for sinus pain.    Musculoskeletal:  Negative for neck pain.   Neurological:  Positive for dizziness.   Hematological:  Negative for adenopathy.     Objective:     Vital Signs (Most Recent):  Temp: 98 °F (36.7 °C) (09/04/24 0800)  Pulse: 61 (09/04/24 0800)  Resp: 18 (09/04/24 0800)  BP: (!) 146/80 (09/04/24 0800)  SpO2: 99 % (09/04/24 0800) Vital Signs (24h Range):  Temp:  [97.5 °F (36.4 °C)-98.2 °F (36.8 °C)] 98 °F (36.7 °C)  Pulse:  [58-74] 61  Resp:  [12-20] 18  SpO2:  [95 %-99 %] 99 %  BP: (111-184)/() 146/80     Weight: 58.4 kg (128 lb 12 oz)  Body mass index is 22.81 kg/m².    Date 09/04/24 0700 -  09/05/24 0659   Shift 1450-3785 8581-6357 2769-7814 24 Hour Total   INTAKE   P.O. 120   120   Shift Total(mL/kg) 120(2.1)   120(2.1)   OUTPUT   Shift Total(mL/kg)       Weight (kg) 58.4 58.4 58.4 58.4       Physical Exam  HENT:      Head: Normocephalic.        Mouth/Throat:      Mouth: Mucous membranes are moist.      Tongue: No lesions. Tongue does not deviate from midline.      Comments: No evidence of hemangioma nor lesion   Lymphadenopathy:      Cervical: No cervical adenopathy.   Neurological:      Mental Status: He is alert.         Significant Labs:  CBC:   Recent Labs   Lab 09/04/24 0457   WBC 8.21   RBC 5.42   HGB 16.3   HCT 49.1      MCV 91   MCH 30.1   MCHC 33.2     CMP:   Recent Labs   Lab 09/04/24 0457   GLU 98   CALCIUM 9.3      K 4.3   CO2 26      BUN 12   CREATININE 0.8       Significant Diagnostics:  CT: I have reviewed all pertinent results/findings within the past 24 hours and my personal findings are:  subcutaneous mass - has enlarged since 2021 but appears to be relatively well circumscribed, some underlying stranding near temporalis muscle   MRI: I have reviewed all pertinent results/findings within the past 24 hours and my personal findings are:  T2 low signal ; t1 bright but incompletely imaged                2021    Assessment/Plan:     Active Diagnoses:    Diagnosis Date Noted POA    PRINCIPAL PROBLEM:  Dizziness [R42] 09/03/2024 Yes    Abnormal CT scan of head [R93.0] 09/04/2024 Yes    Essential hypertension [I10] 09/04/2024 Yes    Pre-diabetes [R73.03] 09/04/2024 Yes    Other hyperlipidemia [E78.49] 09/04/2024 Yes      Problems Resolved During this Admission:     VTE Risk Mitigation (From admission, onward)           Ordered     Reason for No Pharmacological VTE Prophylaxis  Once        Question:  Reasons:  Answer:  Risk of Bleeding  Comment:  awaiting MRI brain r/o mass    09/03/24 2135     Place SERA hose  Until discontinued         09/03/24 2135     IP VTE HIGH RISK  PATIENT  Once         09/03/24 2135     Place sequential compression device  Until discontinued         09/03/24 2135                Thank you for your consult.  New mri pending  May need excision since appears enlarged from before and some features on imaging concerning for possible disease that may not be benign although exam without ulceration. May need to get dedicated ct neck as well     Will have him follow up as outpatient to discuss next steps    Jamia Campos MD  Otorhinolaryngology-Head & Neck Surgery  [facility]

## 2024-09-04 NOTE — PT/OT/SLP EVAL
"Physical Therapy Evaluation     Patient Name: Maggie Bowen   MRN: [id number]  Recent Surgery: * No surgery found *      Recommendations:     Discharge Recommendations: Low Intensity Therapy (OPPT)   Discharge Equipment Recommendations: none       Assessment:     Maggie Bowen is a 79 y.o. male admitted with a medical diagnosis of Dizziness. He presents with the following impairments/functional limitations: impaired functional mobility, gait instability, impaired balance.     Rehab Prognosis: Good; patient would benefit from acute PT services to address these deficits and reach maximum level of function.    Plan:     During this hospitalization, patient to be seen 3 x/week to address the above listed problems via gait training, therapeutic activities, therapeutic exercises    Plan of Care Expires: 09/11/24    Subjective     Chief Complaint: "I need to use the bathroom"  Patient Comments/Goals: Pt requesting to get to toilet/Resolve dizziness  Pain/Comfort:  Pain Rating 1: 0/10    Social History:a 2 story home  Living Environment: Patient lives with their spouse in  with number of outside stair(s): 1 and can live on 1st floor  Prior Level of Function: Prior to admission, patient was independent  Equipment Used at Home: none  DME owned (not currently used): none  Assistance Upon Discharge: family    Objective:     Communicated with nurseAlly after session. Patient found supine with pulse ox (continuous), SCD, telemetry upon PT entry to room.    General Precautions: Standard,     Orthopedic Precautions: N/A   Respiratory Status: Room air    Exams:  Cognition: Patient is oriented to Person, Place, Time, Situation  RLE ROM: WFL  RLE Strength: WFL  LLE ROM: WFL  LLE Strength: WFL  Sensation:    -       Intact  light/touch BLEs    Functional Mobility:  Gait belt applied - Yes  Bed Mobility  Supine to Sit: stand by assistance for LE management and trunk management  Transfers  Sit to Stand: contact guard assistance with no " AD  Gait  Patient ambulated 10'x2 with no AD and contact guard assistance. Patient demonstrates occasional unsteady gait.   Balance  Sitting: independence  Standing: contact guard assistance      Therapeutic Activities and Exercises:   Patient educated on role of acute care PT and PT POC and call light usage  Patient educated about importance of OOB mobility and remaining up in chair most of the day.  OOB, ambulated to ANDRZEJ, OT, Meenu for (S) with toileting then stood at sink to wash hands.    AM-PAC 6 CLICK MOBILITY  Total Score:20    Patient left  reclined in chair  with all lines intact, call button in reach, RN notified, and spouse and daughter-in-law present.    GOALS:   Multidisciplinary Problems       Physical Therapy Goals          Problem: Physical Therapy    Goal Priority Disciplines Outcome Goal Variances Interventions   Physical Therapy Goal     PT, PT/OT Progressing     Description: Goals to be met by: 24     Patient will increase functional independence with mobility by performin. Pt to be mod I with bed mobility.  2. Pt to transfer with (S).  3. Pt to ambulate 150' /s AD (S).  4. Pt to demonstrate good static/dynamic standing balance.                         History:     Past Medical History:   Diagnosis Date    Elevated cholesterol     on medication    Hypertension     Reflux     on medication       Past Surgical History:   Procedure Laterality Date    TONGUE BIOPSY  2014    (at [facility])       Time Tracking:     PT Received On: 24  PT Start Time: 922  PT Stop Time: 936  PT Total Time (min): 14 min     Billable Minutes: Evaluation 14 (Co-eval with WAYNE Corrigan)    2024

## 2024-09-04 NOTE — PT/OT/SLP EVAL
Occupational Therapy   Evaluation    Name: Maggie Bowen  MRN: [id number]  Admitting Diagnosis: Dizziness  Recent Surgery: * No surgery found *      Recommendations:     Discharge Recommendations:  Low Intensity Therapy (OP PT)  Discharge Equipment Recommendations:   none  Barriers to discharge:   impaired balance    Assessment:     Maggie Bowen is a 79 y.o. male with a medical diagnosis of Dizziness.  He presents with The primary encounter diagnosis was Dizziness. Diagnoses of Acute focal neurological deficit, Abnormal CT of brain, Abnormal CT scan of head, and Essential hypertension were also pertinent to this visit. . Performance deficits affecting function: impaired functional mobility, gait instability, impaired balance, weakness, impaired self care skills, pain.      OT initial eval completed. Pt presenting with generalized weakness/dizziness from baseline. Skilled acute OT to follow. Recommend LIT once medically stable.     Rehab Prognosis: Good; patient would benefit from acute skilled OT services to address these deficits and reach maximum level of function.       Plan:     Patient to be seen 3 x/week to address the above listed problems via self-care/home management, therapeutic activities, therapeutic exercises  Plan of Care Expires: 09/18/24  Plan of Care Reviewed with:  patient, family    Subjective     Chief Complaint: pain  Patient/Family Comments/goals: dizziness upon stand and worsening HA at end of session; daughter in law present and pt agreeable for DIL to translate during session    Occupational Profile:  Living Environment: Lives with spouse, 2SH, able to live on 1st floor, threshold to enter, WIS  Previous level of function: Independent  Equipment Used at Home:  none  Assistance upon Discharge: spouse    Pain/Comfort:  Pain Rating 1: 2/10 (HA)  Pain Addressed 1: Pre-medicate for activity, Reposition, Distraction, Cessation of Activity  Pain Rating Post-Intervention 1: 3/10    Objective:      Communicated with: RN prior to session.  Patient found HOB elevated with   continuous pulse ox, telemetry, SCD upon OT entry to room.    General Precautions: Standard, fall  Orthopedic Precautions:  N/A  Braces: N/A  Respiratory Status: Room air    Occupational Performance:    Bed Mobility:    Patient completed Scooting/Bridging with stand by assistance  Patient completed Supine to Sit with stand by assistance    Functional Mobility/Transfers:  Patient completed Sit <> Stand Transfer with contact guard assistance  with  no assistive device   Patient completed Bed <> Chair Transfer using Step Transfer technique with contact guard assistance with no assistive device  Patient completed Toilet Transfer Step Transfer technique with contact guard assistance with  no AD  Functional Mobility: Pt performed in room mobility with CGA and no AD    Activities of Daily Living:  Grooming: contact guard assistance /SBA standing at sink for hand hygiene  Upper Body Dressing: minimum assistance to don gown 2/2 line mgmt  Toileting: contact guard assistance for steadying in stand at toilet for urination    Cognitive/Visual Perceptual:  Cognitive/Psychosocial Skills:     -       Oriented to: Person, Place, Time, and Situation   -       Follows Commands/attention:Follows two-step commands  -       Communication: clear/fluent  -       Memory: No Deficits noted  -       Safety awareness/insight to disability: intact   -       Mood/Affect/Coping skills/emotional control: Cooperative and Pleasant  Visual/Perceptual:      -Intact  acuity      Physical Exam:  Balance:    -       CGA dynamic stand; SBA static  Upper Extremity Range of Motion:     -       Right Upper Extremity: WFL  -       Left Upper Extremity: WFL  Upper Extremity Strength:    -       Right Upper Extremity: WFL  -       Left Upper Extremity: WFL   Strength:    -       Right Upper Extremity: WFL  -       Left Upper Extremity: WFL  Fine Motor Coordination:    -        Intact  Left hand, manipulation of objects and Right hand, manipulation of objects  Gross motor coordination:   WFL    AMPAC 6 Click ADL:  AMPAC Total Score: 21    Treatment & Education:  Patient educated on role of OT/ POC development. Co-eval with PT to maximize function and safety. Occupational profile developed via interview. Patient guided to transition eob for assessment. Initiated ADL / functional mobility training as above with instruction on slow, safe transitions. Educated patient on importance of out of bed mobility, movement/repositioning throughout the day, and call button use. Answered questions within scope, and all needs met.     Patient left up in chair with all lines intact, call button in reach, RN notified, and family present    GOALS:   Multidisciplinary Problems       Occupational Therapy Goals          Problem: Occupational Therapy    Goal Priority Disciplines Outcome Interventions   Occupational Therapy Goal     OT, PT/OT Progressing    Description: Goals to be met by: 9/18/2024     Patient will increase functional independence with ADLs by performing:    UE Dressing with [place].  LE Dressing with [place].  Grooming while standing at sink with [place].  Toileting from toilet with [place] for hygiene and clothing management.   Step transfer with [place]  Toilet transfer to toilet with [place].                         History:     Past Medical History:   Diagnosis Date    Elevated cholesterol     on medication    Hypertension     Reflux     on medication         Past Surgical History:   Procedure Laterality Date    TONGUE BIOPSY  Nov. 2014    (at [facility])       Time Tracking:     OT Date of Treatment: 09/04/24  OT Start Time: 0922  OT Stop Time: 0936  OT Total Time (min): 14 min PT cotreat    Billable Minutes:Evaluation 14    9/4/2024

## 2024-09-04 NOTE — PROGRESS NOTES
Pt ate chicken sandwich and orange juice, after verifying with the provider, Dr Berg.  Pt informed about NPO order past midnight for the possible MRI test tomorrow. Pt verbalize understanding.  No any question for the nurse at this time.

## 2024-09-04 NOTE — SUBJECTIVE & OBJECTIVE
Past Medical History:   Diagnosis Date    Elevated cholesterol     on medication    Hypertension     Reflux     on medication       Past Surgical History:   Procedure Laterality Date    TONGUE BIOPSY  Nov. 2014    (at [facility])       Review of patient's allergies indicates:   Allergen Reactions    Influenza virus vaccines        No current facility-administered medications on file prior to encounter.     Current Outpatient Medications on File Prior to Encounter   Medication Sig    acetaminophen (TYLENOL) 500 MG tablet Take 2 tablets (1,000 mg total) by mouth every 6 (six) hours as needed for Pain.    amlodipine (NORVASC) 5 MG tablet Take 5 mg by mouth once daily.    atorvastatin (LIPITOR) 20 MG tablet Take 20 mg by mouth once daily.    ibuprofen (ADVIL,MOTRIN) 200 MG tablet Take 2 tablets (400 mg total) by mouth every 6 (six) hours as needed for Pain.    metoclopramide HCl (REGLAN) 10 MG tablet Take 1 tablet (10 mg total) by mouth every 6 (six) hours.    omeprazole (PRILOSEC) 20 MG capsule Take 20 mg by mouth once daily.    ondansetron (ZOFRAN) 4 MG tablet Take 1 tablet (4 mg total) by mouth every 6 (six) hours.    ondansetron (ZOFRAN-ODT) 4 MG TbDL Take 1 tablet (4 mg total) by mouth every 8 (eight) hours as needed (nausea).     Family History    None       Tobacco Use    Smoking status: Former    Smokeless tobacco: Not on file   Substance and Sexual Activity    Alcohol use: No    Drug use: No    Sexual activity: Not on file     Review of Systems   Constitutional:  Positive for activity change. Negative for appetite change (today due to dizziness and HA), chills, diaphoresis, fatigue, fever and unexpected weight change.   HENT:  Negative for congestion, rhinorrhea and sinus pressure.    Eyes:  Negative for visual disturbance.   Respiratory:  Negative for cough, chest tightness, shortness of breath and wheezing.    Cardiovascular:  Negative for chest pain, palpitations and leg swelling.   Gastrointestinal:   Positive for nausea (due to dizziness). Negative for abdominal pain, constipation, diarrhea and vomiting.   Genitourinary:  Negative for dysuria.   Musculoskeletal:  Negative for arthralgias, back pain, gait problem and myalgias.   Neurological:  Positive for dizziness, light-headedness and headaches. Negative for seizures, syncope and numbness.   Psychiatric/Behavioral:  Negative for confusion and dysphoric mood. The patient is not nervous/anxious.      Objective:     Vital Signs (Most Recent):  Temp: 97.7 °F (36.5 °C) (09/04/24 0335)  Pulse: 74 (09/04/24 0642)  Resp: 18 (09/04/24 0335)  BP: 111/76 (09/04/24 0335)  SpO2: 95 % (09/04/24 0500) Vital Signs (24h Range):  Temp:  [97.5 °F (36.4 °C)-98.2 °F (36.8 °C)] 97.7 °F (36.5 °C)  Pulse:  [58-74] 74  Resp:  [12-20] 18  SpO2:  [95 %-98 %] 95 %  BP: (111-184)/() 111/76     Weight: 58.4 kg (128 lb 12 oz)  Body mass index is 22.81 kg/m².     Physical Exam  Vitals and nursing note reviewed.   Constitutional:       General: He is not in acute distress.     Appearance: Normal appearance. He is not ill-appearing, toxic-appearing or diaphoretic.      Comments: Thin, lean appearing    HENT:      Head: Normocephalic and atraumatic.      Nose: Nose normal. No congestion or rhinorrhea.      Mouth/Throat:      Mouth: Mucous membranes are moist.      Pharynx: Oropharynx is clear. No oropharyngeal exudate or posterior oropharyngeal erythema.   Eyes:      General: No scleral icterus.     Extraocular Movements: Extraocular movements intact.      Conjunctiva/sclera: Conjunctivae normal.      Pupils: Pupils are equal, round, and reactive to light.   Neck:      Vascular: No carotid bruit.   Cardiovascular:      Rate and Rhythm: Normal rate and regular rhythm.      Pulses: Normal pulses.      Heart sounds: No murmur heard.     No friction rub. No gallop.   Pulmonary:      Effort: Pulmonary effort is normal. No respiratory distress.      Breath sounds: No wheezing, rhonchi or  rales.   Abdominal:      General: Abdomen is flat. There is no distension.      Palpations: Abdomen is soft.      Tenderness: There is no abdominal tenderness. There is no guarding or rebound.   Musculoskeletal:         General: No swelling. Normal range of motion.      Cervical back: Normal range of motion and neck supple.      Right lower leg: No edema.      Left lower leg: No edema.   Skin:     General: Skin is warm and dry.      Capillary Refill: Capillary refill takes less than 2 seconds.      Coloration: Skin is pale.   Neurological:      General: No focal deficit present.      Mental Status: He is alert and oriented to person, place, and time. Mental status is at baseline.      Cranial Nerves: No cranial nerve deficit.      Motor: No weakness.      Coordination: Coordination normal.   Psychiatric:         Mood and Affect: Mood normal.         Behavior: Behavior normal.              CRANIAL NERVES     CN III, IV, VI   Pupils are equal, round, and reactive to light.       Recent Results (from the past 24 hour(s))   POCT glucose    Collection Time: 09/03/24  1:39 PM   Result Value Ref Range    POCT Glucose 109 70 - 110 mg/dL   POCT CMP    Collection Time: 09/03/24  1:53 PM   Result Value Ref Range    Albumin, POC 3.7 3.3 - 5.5 g/dL    Alkaline Phosphatase, POC 53 42 - 141 U/L    ALT (SGPT), POC 30 10 - 47 U/L    AST (SGOT), POC 34 11 - 38 U/L    POC BUN 9 7 - 22 mg/dL    Calcium, POC 9.3 8.0 - 10.3 mg/dL    POC Chloride 108 98 - 108 mmol/L    POC Creatinine 0.8 0.6 - 1.2 mg/dL    POC Glucose 114 73 - 118 mg/dL    POC Potassium 4.1 3.6 - 5.1 mmol/L    POC Sodium 143 128 - 145 mmol/L    Bilirubin, POC 0.8 0.2 - 1.6 mg/dL    POC TCO2 29 18 - 33 mmol/L    Protein, POC 8.0 6.4 - 8.1 g/dL   ECG 12 lead    Collection Time: 09/03/24  1:53 PM   Result Value Ref Range    QRS Duration 132 ms    [facility] QTC Calculation 401 ms   ISTAT PROCEDURE    Collection Time: 09/03/24  1:58 PM   Result Value Ref Range    POC PTWBT 13.1 9.7  - 14.3 sec    POC PTINR 1.1 0.9 - 1.2    Sample unknown    POCT CBC    Collection Time: 09/03/24  2:00 PM   Result Value Ref Range    Hematocrit      Hemoglobin      RBC      WBC      MCV      MCH, POC      MCHC      RDW-CV      Platelet Count, POC      MPV     ISTAT PROCEDURE    Collection Time: 09/03/24  2:00 PM   Result Value Ref Range    POC PH 7.395 7.35 - 7.45    POC PCO2 42.9 35 - 45 mmHg    POC PO2 76 (HH) 40 - 60 mmHg    POC HCO3 26.3 24 - 28 mmol/L    POC BE 1 -2 to 2 mmol/L    POC SATURATED O2 95 95 - 100 %    POC Lactate 1.10 0.5 - 2.2 mmol/L    POC TCO2 28 24 - 29 mmol/L    Sample VENOUS     Site Other     Allens Test N/A    Basic metabolic panel    Collection Time: 09/03/24  7:39 PM   Result Value Ref Range    Sodium 139 136 - 145 mmol/L    Potassium 3.7 3.5 - 5.1 mmol/L    Chloride 106 95 - 110 mmol/L    CO2 23 23 - 29 mmol/L    Glucose 108 70 - 110 mg/dL    BUN 9 8 - 23 mg/dL    Creatinine 0.8 0.5 - 1.4 mg/dL    Calcium 9.5 8.7 - 10.5 mg/dL    Anion Gap 10 8 - 16 mmol/L    eGFR >60 >60 mL/min/1.73 m^2   Magnesium    Collection Time: 09/03/24  7:39 PM   Result Value Ref Range    Magnesium 2.3 1.6 - 2.6 mg/dL   Phosphorus    Collection Time: 09/03/24  7:39 PM   Result Value Ref Range    Phosphorus 2.6 (L) 2.7 - 4.5 mg/dL   TSH    Collection Time: 09/03/24  7:39 PM   Result Value Ref Range    TSH 0.523 0.400 - 4.000 uIU/mL   Type & Screen    Collection Time: 09/03/24  7:39 PM   Result Value Ref Range    Group & Rh O POS     Indirect Dima NEG     Specimen Outdate 09/06/2024 23:59    Protime-INR    Collection Time: 09/03/24  7:39 PM   Result Value Ref Range    Prothrombin Time 10.5 9.0 - 12.5 sec    INR 1.0 0.8 - 1.2   APTT    Collection Time: 09/03/24  7:39 PM   Result Value Ref Range    aPTT 29.2 21.0 - 32.0 sec   CBC Auto Differential    Collection Time: 09/03/24  7:39 PM   Result Value Ref Range    WBC 8.84 3.90 - 12.70 K/uL    RBC 5.48 4.60 - 6.20 M/uL    Hemoglobin 16.8 14.0 - 18.0 g/dL     Hematocrit 49.0 40.0 - 54.0 %    MCV 89 82 - 98 fL    MCH 30.7 27.0 - 31.0 pg    MCHC 34.3 32.0 - 36.0 g/dL    RDW 13.1 11.5 - 14.5 %    Platelets 252 150 - 450 K/uL    MPV 9.2 9.2 - 12.9 fL    Immature Granulocytes 0.2 0.0 - 0.5 %    Gran # (ANC) 4.5 1.8 - 7.7 K/uL    Immature Grans (Abs) 0.02 0.00 - 0.04 K/uL    Lymph # 3.4 1.0 - 4.8 K/uL    Mono # 0.7 0.3 - 1.0 K/uL    Eos # 0.1 0.0 - 0.5 K/uL    Baso # 0.05 0.00 - 0.20 K/uL    nRBC 0 0 /100 WBC    Gran % 51.1 38.0 - 73.0 %    Lymph % 38.8 18.0 - 48.0 %    Mono % 8.1 4.0 - 15.0 %    Eosinophil % 1.2 0.0 - 8.0 %    Basophil % 0.6 0.0 - 1.9 %    Differential Method Automated    Basic metabolic panel    Collection Time: 09/04/24  4:57 AM   Result Value Ref Range    Sodium 140 136 - 145 mmol/L    Potassium 4.3 3.5 - 5.1 mmol/L    Chloride 106 95 - 110 mmol/L    CO2 26 23 - 29 mmol/L    Glucose 98 70 - 110 mg/dL    BUN 12 8 - 23 mg/dL    Creatinine 0.8 0.5 - 1.4 mg/dL    Calcium 9.3 8.7 - 10.5 mg/dL    Anion Gap 8 8 - 16 mmol/L    eGFR >60 >60 mL/min/1.73 m^2   CBC Auto Differential    Collection Time: 09/04/24  4:57 AM   Result Value Ref Range    WBC 8.21 3.90 - 12.70 K/uL    RBC 5.42 4.60 - 6.20 M/uL    Hemoglobin 16.3 14.0 - 18.0 g/dL    Hematocrit 49.1 40.0 - 54.0 %    MCV 91 82 - 98 fL    MCH 30.1 27.0 - 31.0 pg    MCHC 33.2 32.0 - 36.0 g/dL    RDW 13.2 11.5 - 14.5 %    Platelets 225 150 - 450 K/uL    MPV 9.0 (L) 9.2 - 12.9 fL    Immature Granulocytes 0.4 0.0 - 0.5 %    Gran # (ANC) 4.3 1.8 - 7.7 K/uL    Immature Grans (Abs) 0.03 0.00 - 0.04 K/uL    Lymph # 2.8 1.0 - 4.8 K/uL    Mono # 0.8 0.3 - 1.0 K/uL    Eos # 0.1 0.0 - 0.5 K/uL    Baso # 0.06 0.00 - 0.20 K/uL    nRBC 0 0 /100 WBC    Gran % 52.7 38.0 - 73.0 %    Lymph % 34.5 18.0 - 48.0 %    Mono % 10.1 4.0 - 15.0 %    Eosinophil % 1.6 0.0 - 8.0 %    Basophil % 0.7 0.0 - 1.9 %    Differential Method Automated        Microbiology Results (last 7 days)       ** No results found for the last 168 hours. **             Imaging Results              X-Ray Chest AP Portable (Final result)  Result time 09/03/24 16:48:14      Final result by Nahid Rosa MD (09/03/24 16:48:14)                   Impression:      No radiographic acute intrathoracic process seen on this single view.      Electronically signed by: Nahid Rosa MD  Date:    09/03/2024  Time:    16:48               Narrative:    EXAMINATION:  XR CHEST AP PORTABLE    CLINICAL HISTORY:  Dizziness and giddiness    TECHNIQUE:  Single frontal view of the chest was performed.    COMPARISON:  None    FINDINGS:  Monitoring leads overlie the chest.  Patient is rotated.    Nonspecific elevation of the right hemidiaphragm.  Mediastinal structures are midline with calcification and tortuosity of the aorta.  Heart is not significantly enlarged.  Pulmonary vasculature and hilar contours are within normal limits.  Bibasilar mild platelike scarring versus atelectasis.  No consolidation, pleural effusion or pneumothorax.                                        CT Head Without Contrast (Final result)  Result time 09/03/24 13:52:24      Final result by Nahid Rosa MD (09/03/24 13:52:24)                   Impression:      1. No acute large vascular territory infarct or intracranial hemorrhage identified.  If persistent neurologic deficit, MRI brain can be obtained.  2. Similar changes of chronic microvascular ischemic disease with cerebral volume loss.  3. Right infratemporal fossa region plaque-like soft tissue density lesion as above, nonspecific.  Sequela of underlying malignancy including squamous cell carcinoma should be excluded.  Further evaluation/follow-up as warranted.  This report was flagged in Epic as containing an incidental finding.      Electronically signed by: Nahid Rosa MD  Date:    09/03/2024  Time:    13:52               Narrative:    EXAMINATION:  CT HEAD WITHOUT CONTRAST    CLINICAL HISTORY:  Neuro deficit, acute, stroke suspected;    TECHNIQUE:  Low dose  axial CT images obtained throughout the head without intravenous contrast. Sagittal and coronal reconstructions were performed.    COMPARISON:  Head CT 01/03/2021    FINDINGS:  Intracranial compartment:    Generalized cerebral volume loss.  Ventricles are midline and similar in overall size and configuration without distortion by mass effect or acute hydrocephalus.  No extra-axial blood or fluid collections.    Gross similar distribution of patchy hypoattenuation of the supratentorial white matter likely sequela of chronic microvascular ischemic change.  No definite new focal areas of abnormal parenchymal attenuation.  No parenchymal mass, hemorrhage, edema or major vascular distribution infarct.  Skull base atherosclerotic vascular calcifications noted.    Skull/extracranial contents (limited evaluation): No fracture.  Suspected small retention cyst versus polyp in the right maxillary sinus.  Mastoid air cells and remaining imaged paranasal sinuses are essentially clear.  Similar degenerative change at the left TMJ.  Suspected remote operative change of the bilateral ocular lenses.  At the posterior aspect of the right infratemporal fossa there is approximate 1.1 x 3.3 cm slightly heterogeneous soft tissue density mass primarily centered at the subcutaneous fat with extension to the overlying skin.  This is more conspicuous and larger from 01/03/2021 study.  No associated internal gas foci or walled-off fluid collection to suggest drainable abscess.

## 2024-09-04 NOTE — PROGRESS NOTES
Pt arrive to the unit by stretcher, accompanied by netta, assisted pt to bed. Tele monitoring initiated.  Admit assessment initiated.  Pt is AAO X 4, Italian speaking. Son at bedside translating for pt.   VS obtained and recorded. Pt complaining of headache at parietal region, explains it as pressure like pain, 9/10. Manual /80.      Bed placed low in ht, wheels locked, side rails up X 3, call light with in reach.  Plan of care reviewed with the pt and family at bedside.            [facility] [place] -- 4 Eyes      9/4/2024       Skin assessed on: Admit      [x] No Pressure Injuries Present    []Prevention Measures Documented    [] Yes LDA  for Pressure Injury Previously documented     [] Yes New Pressure Injury Discovered   [] LDA for New Pressure Injury Added      Attending RN:  Lisbet Mckeon, RN     Second RN:  SUSIE Mclain

## 2024-09-04 NOTE — DISCHARGE SUMMARY
"[facility] Medicine  Discharge Summary      Patient Name: Maggie Bowen  MRN: [id number]  HALIE: [id number]  Patient Class: OP- Observation  Admission Date: 9/3/2024  Hospital Length of Stay: 0 days  Discharge Date and Time:  09/04/2024 3:22 PM  Attending Physician: Joanne Mcclure DO   Discharging Provider: Joanne Mcclure DO  Primary Care Provider: Clarice Keita MD    Primary Care Team: Networked reference to record PCT     HPI:   79 y.o. [place] man with h/o Essential HTN, HLD, BPH, and pre-DM presents to the [facility] ED with h/o dizziness and NUGENT. States was fine yesterday but this am woke up with severe HA all over.  NO vision changes or focal neurological deficits. Family denies speech changes/impairments. NO facial droop. No c/o palpitations or chest pain/pressure. NO N/V/Diarrhea.     He was stoke activated with CT non-contrasted Impression:     1. No acute large vascular territory infarct or intracranial hemorrhage identified.  If persistent neurologic deficit, MRI brain can be obtained.  2. Similar changes of chronic microvascular ischemic disease with cerebral volume loss.  3. Right infratemporal fossa region plaque-like soft tissue density lesion as above, nonspecific.  Sequela of underlying malignancy including squamous cell carcinoma should be excluded.  Further evaluation/follow-up as warranted.  This report was flagged in Epic as containing an incidental finding.    Vascular neurology was consulted and per their recs:   "Dizziness - question of unsteady gate. Patient w/ NIHSS 0, transferring and ambulating without difficulty. No clear focal signs consistent with stroke, although isolated vertigo / acute vestibular syndrome could be a sign of stroke - however this patient not exhibiting clear signs and symptoms of acute vestibular syndrome."  Thrombolytics were not recommended due to suspected stroke mimic and Mild Non-Disabling Symptoms      He was transferred to [facility] for " further work up of dizziness possible TIA.     Because this patient's clinical condition of dizziness is still persistent and requires further work up by neurology for dizzines and r/o CVA, care in an alternative location isn't clinically appropriate for the reasons stated above.       * No surgery found *      Hospital Course:   79 y.o. male with h/o Essential HTN, HLD, BPH, and pre-DM admitted to observation on 09/03/2024 for further evaluation of headaches and dizziness.  Denies any vision changes or focal deficits.  CT head with no acute large vascular territory infarct or intracranial hemorrhage.  However, noted right infratemporal fossa region plaque like soft tissue density lesion.  MRI brain without contrast ordered and showed  No evidence of intracranial metastatic disease, however noting that patient terminated examination prior to completion of axial T1/T2 sequences and postcontrast imaging.Right temporal scalp indeterminate soft tissue lesion. Repeated MRI brain. ENT consulted-recommends outpatient follow up for scalp lesion.     Patient admits feeling significantly better overall.  States the dizziness has resolved, but still has a mild headaches. Pt denies any fever, headaches, vision changes, chest pain, shortness of breath, palpitations, abdominal pain, nausea, vomiting, or any new weaknesses. Feels ready to go home. Patient's exam on discharge was as follow: Patient is alert and oriented, appears in no acute distress, heart with regular rate and rhythm, lungs clear to asculation with non-labored breathing, abdomen soft, and no new weaknesses or focal deficits seen. Bilateral lower extremities without any edema or calf tenderness.     Patient was counseled regarding any abnormal labs, differential diagnosis, treatment options, risk-benefit, lifestyle changes, prognosis, current condition, and medications. Patient was interactive and attentive.  Patient's questions were answered in a respectful and  timely manner. Patient was instructed to follow-up with PCP within 1 week and to continue taking medications as prescribed.  Instructed to also follow up with ENT. Also, extensively discussed the risks, benefits, and side effects of patient's medications. Discussed with patient about any medication changes. Patient verbalized understanding and agrees to treatment plan.  Patient is stable for discharge.  Patient has no other questions or concerns at this time.  ED precautions discussed with the patient.    Vital signs are stable. Ambulating without any difficulty. Tolerating p.o. intake without any nausea or vomiting. Afebrile for over 24 hours. Patient is in stable condition and has no questions or concerns. Patient will be discharge to home once transportation secured.  Ambulatory referral for outpatient PT/OT ordered . Prescriptions sent to pharmacy.  CM/SW to assist with discharge planning.     Vitals:    09/04/24 0642 09/04/24 0800 09/04/24 1108 09/04/24 1137   BP:  (!) 146/80 (!) 143/78    BP Location:  Right arm Right arm    Patient Position:  Lying Lying    Pulse: 74 61 65 65   Resp:  18 18    Temp:  98 °F (36.7 °C) 97.5 °F (36.4 °C)    TempSrc:  Oral Oral    SpO2:  99% 97%    Weight:       Height:                Goals of Care Treatment Preferences:  Code Status: Full Code         Consults:   Consults (From admission, onward)          Status Ordering Provider     Inpatient consult to ENT  Once        Provider:  Jamia Campos MD    Acknowledged SAPPHIRE BAUTISTA     Inpatient consult to Registered Dietitian/Nutritionist  Once        Provider:  (Not yet assigned)    Completed LUZ BLACKWELL     IP consult to case management/social work  Once        Provider:  (Not yet assigned)    Completed LUZ BLACKWELL     Consult to Telemedicine - Acute Stroke  Once        Provider:  (Not yet assigned)    Completed LONNIE DICKERSON            No new Assessment & Plan notes have been filed under this hospital  service since the last note was generated.  Service: Hospital Medicine    Final Active Diagnoses:    Diagnosis Date Noted POA    PRINCIPAL PROBLEM:  Dizziness [R42] 09/03/2024 Yes    Abnormal CT scan of head [R93.0] 09/04/2024 Yes    Essential hypertension [I10] 09/04/2024 Yes    Pre-diabetes [R73.03] 09/04/2024 Yes    Other hyperlipidemia [E78.49] 09/04/2024 Yes      Problems Resolved During this Admission:       Discharged Condition: stable    Disposition: Home or Self Care    Follow Up:   Follow-up Information       Clarice Keita MD Follow up on 9/10/2024.    Specialty: Internal Medicine  Why: at 10am  Contact information:  [address] [facility]  [place] [address]  [phone]               Jamia Campos MD Follow up on 9/13/2024.    Specialty: Otolaryngology  Why: [id number]  Contact information:  [address] [facility]  [place] [address]  [phone]                           Patient Instructions:      Ambulatory referral/consult to Physical/Occupational Therapy   Standing Status: Future   Referral Priority: Routine Referral Type: Physical Medicine   Referral Reason: Specialty Services Required   Number of Visits Requested: 1     Diet Cardiac     Notify your health care provider if you experience any of the following:  temperature >100.4     Notify your health care provider if you experience any of the following:  persistent nausea and vomiting or diarrhea     Notify your health care provider if you experience any of the following:  severe uncontrolled pain     Notify your health care provider if you experience any of the following:  severe persistent headache     Notify your health care provider if you experience any of the following:  increased confusion or weakness     Notify your health care provider if you experience any of the following:  persistent dizziness, light-headedness, or visual disturbances     Activity as tolerated       Significant Diagnostic Studies: Labs: All labs within the past 24 hours have been  reviewed      Recent Results (from the past 100 hour(s))   POCT glucose    Collection Time: 09/03/24  1:39 PM   Result Value Ref Range    POCT Glucose 109 70 - 110 mg/dL   POCT CMP    Collection Time: 09/03/24  1:53 PM   Result Value Ref Range    Albumin, POC 3.7 3.3 - 5.5 g/dL    Alkaline Phosphatase, POC 53 42 - 141 U/L    ALT (SGPT), POC 30 10 - 47 U/L    AST (SGOT), POC 34 11 - 38 U/L    POC BUN 9 7 - 22 mg/dL    Calcium, POC 9.3 8.0 - 10.3 mg/dL    POC Chloride 108 98 - 108 mmol/L    POC Creatinine 0.8 0.6 - 1.2 mg/dL    POC Glucose 114 73 - 118 mg/dL    POC Potassium 4.1 3.6 - 5.1 mmol/L    POC Sodium 143 128 - 145 mmol/L    Bilirubin, POC 0.8 0.2 - 1.6 mg/dL    POC TCO2 29 18 - 33 mmol/L    Protein, POC 8.0 6.4 - 8.1 g/dL   ECG 12 lead    Collection Time: 09/03/24  1:53 PM   Result Value Ref Range    QRS Duration 132 ms    [facility] QTC Calculation 401 ms   ISTAT PROCEDURE    Collection Time: 09/03/24  1:58 PM   Result Value Ref Range    POC PTWBT 13.1 9.7 - 14.3 sec    POC PTINR 1.1 0.9 - 1.2    Sample unknown    POCT CBC    Collection Time: 09/03/24  2:00 PM   Result Value Ref Range    Hematocrit      Hemoglobin      RBC      WBC      MCV      MCH, POC      MCHC      RDW-CV      Platelet Count, POC      MPV     ISTAT PROCEDURE    Collection Time: 09/03/24  2:00 PM   Result Value Ref Range    POC PH 7.395 7.35 - 7.45    POC PCO2 42.9 35 - 45 mmHg    POC PO2 76 (HH) 40 - 60 mmHg    POC HCO3 26.3 24 - 28 mmol/L    POC BE 1 -2 to 2 mmol/L    POC SATURATED O2 95 95 - 100 %    POC Lactate 1.10 0.5 - 2.2 mmol/L    POC TCO2 28 24 - 29 mmol/L    Sample VENOUS     Site Other     Allens Test N/A    Basic metabolic panel    Collection Time: 09/03/24  7:39 PM   Result Value Ref Range    Sodium 139 136 - 145 mmol/L    Potassium 3.7 3.5 - 5.1 mmol/L    Chloride 106 95 - 110 mmol/L    CO2 23 23 - 29 mmol/L    Glucose 108 70 - 110 mg/dL    BUN 9 8 - 23 mg/dL    Creatinine 0.8 0.5 - 1.4 mg/dL    Calcium 9.5 8.7 - 10.5 mg/dL     Anion Gap 10 8 - 16 mmol/L    eGFR >60 >60 mL/min/1.73 m^2   Magnesium    Collection Time: 09/03/24  7:39 PM   Result Value Ref Range    Magnesium 2.3 1.6 - 2.6 mg/dL   Phosphorus    Collection Time: 09/03/24  7:39 PM   Result Value Ref Range    Phosphorus 2.6 (L) 2.7 - 4.5 mg/dL   TSH    Collection Time: 09/03/24  7:39 PM   Result Value Ref Range    TSH 0.523 0.400 - 4.000 uIU/mL   Vitamin B12    Collection Time: 09/03/24  7:39 PM   Result Value Ref Range    Vitamin B-12 664 210 - 950 pg/mL   Type & Screen    Collection Time: 09/03/24  7:39 PM   Result Value Ref Range    Group & Rh O POS     Indirect Dima NEG     Specimen Outdate 09/06/2024 23:59    Protime-INR    Collection Time: 09/03/24  7:39 PM   Result Value Ref Range    Prothrombin Time 10.5 9.0 - 12.5 sec    INR 1.0 0.8 - 1.2   APTT    Collection Time: 09/03/24  7:39 PM   Result Value Ref Range    aPTT 29.2 21.0 - 32.0 sec   CBC Auto Differential    Collection Time: 09/03/24  7:39 PM   Result Value Ref Range    WBC 8.84 3.90 - 12.70 K/uL    RBC 5.48 4.60 - 6.20 M/uL    Hemoglobin 16.8 14.0 - 18.0 g/dL    Hematocrit 49.0 40.0 - 54.0 %    MCV 89 82 - 98 fL    MCH 30.7 27.0 - 31.0 pg    MCHC 34.3 32.0 - 36.0 g/dL    RDW 13.1 11.5 - 14.5 %    Platelets 252 150 - 450 K/uL    MPV 9.2 9.2 - 12.9 fL    Immature Granulocytes 0.2 0.0 - 0.5 %    Gran # (ANC) 4.5 1.8 - 7.7 K/uL    Immature Grans (Abs) 0.02 0.00 - 0.04 K/uL    Lymph # 3.4 1.0 - 4.8 K/uL    Mono # 0.7 0.3 - 1.0 K/uL    Eos # 0.1 0.0 - 0.5 K/uL    Baso # 0.05 0.00 - 0.20 K/uL    nRBC 0 0 /100 WBC    Gran % 51.1 38.0 - 73.0 %    Lymph % 38.8 18.0 - 48.0 %    Mono % 8.1 4.0 - 15.0 %    Eosinophil % 1.2 0.0 - 8.0 %    Basophil % 0.6 0.0 - 1.9 %    Differential Method Automated    Hemoglobin A1c    Collection Time: 09/04/24  4:57 AM   Result Value Ref Range    Hemoglobin A1C 5.6 4.0 - 5.6 %    Estimated Avg Glucose 114 68 - 131 mg/dL   Basic metabolic panel    Collection Time: 09/04/24  4:57 AM   Result  Value Ref Range    Sodium 140 136 - 145 mmol/L    Potassium 4.3 3.5 - 5.1 mmol/L    Chloride 106 95 - 110 mmol/L    CO2 26 23 - 29 mmol/L    Glucose 98 70 - 110 mg/dL    BUN 12 8 - 23 mg/dL    Creatinine 0.8 0.5 - 1.4 mg/dL    Calcium 9.3 8.7 - 10.5 mg/dL    Anion Gap 8 8 - 16 mmol/L    eGFR >60 >60 mL/min/1.73 m^2   CBC Auto Differential    Collection Time: 09/04/24  4:57 AM   Result Value Ref Range    WBC 8.21 3.90 - 12.70 K/uL    RBC 5.42 4.60 - 6.20 M/uL    Hemoglobin 16.3 14.0 - 18.0 g/dL    Hematocrit 49.1 40.0 - 54.0 %    MCV 91 82 - 98 fL    MCH 30.1 27.0 - 31.0 pg    MCHC 33.2 32.0 - 36.0 g/dL    RDW 13.2 11.5 - 14.5 %    Platelets 225 150 - 450 K/uL    MPV 9.0 (L) 9.2 - 12.9 fL    Immature Granulocytes 0.4 0.0 - 0.5 %    Gran # (ANC) 4.3 1.8 - 7.7 K/uL    Immature Grans (Abs) 0.03 0.00 - 0.04 K/uL    Lymph # 2.8 1.0 - 4.8 K/uL    Mono # 0.8 0.3 - 1.0 K/uL    Eos # 0.1 0.0 - 0.5 K/uL    Baso # 0.06 0.00 - 0.20 K/uL    nRBC 0 0 /100 WBC    Gran % 52.7 38.0 - 73.0 %    Lymph % 34.5 18.0 - 48.0 %    Mono % 10.1 4.0 - 15.0 %    Eosinophil % 1.6 0.0 - 8.0 %    Basophil % 0.7 0.0 - 1.9 %    Differential Method Automated    POCT glucose    Collection Time: 09/04/24  8:04 AM   Result Value Ref Range    POCT Glucose 109 70 - 110 mg/dL   POCT glucose    Collection Time: 09/04/24 11:07 AM   Result Value Ref Range    POCT Glucose 111 (H) 70 - 110 mg/dL   Echo Saline Bubble? Yes    Collection Time: 09/04/24  1:37 PM   Result Value Ref Range    BSA 1.61 m2    LA WIDTH 2.5 cm    RA Width 2.7 cm    LVOT stroke volume 75.87 cm3    LVIDd 3.40 (A) 3.5 - 6.0 cm    LV Systolic Volume 17.63 mL    LV Systolic Volume Index 11.0 mL/m2    LVIDs 2.27 2.1 - 4.0 cm    LV Diastolic Volume 47.50 mL    LV Diastolic Volume Index 29.69 mL/m2    Left Ventricular End Systolic Volume by Teichholz Method 17.63 mL    Left Ventricular End Diastolic Volume by Teichholz Method 47.50 mL    IVS 1.02 0.6 - 1.1 cm    LVOT diameter 2.35 cm    LVOT area  4.3 cm2    FS 33 28 - 44 %    Left Ventricle Relative Wall Thickness 0.55 cm    Posterior Wall 0.93 0.6 - 1.1 cm    LV mass 95.30 g    LV Mass Index 60 g/m2    MV Peak E Slade 0.67 m/s    TDI LATERAL 0.08 m/s    TDI SEPTAL 0.06 m/s    E/E' ratio 9.57 m/s    MV Peak A Slade 0.96 m/s    TR Max Slade 2.81 m/s    E/A ratio 0.70     IVRT 125.59 msec    E wave deceleration time 276.78 msec    LV SEPTAL E/E' RATIO 11.17 m/s    LA Volume Index 14.4 mL/m2    LV LATERAL E/E' RATIO 8.38 m/s    LA volume 22.97 cm3    PV Peak S Slade 0.51 m/s    PV Peak D Slade 0.38 m/s    Pulm vein S/D ratio 1.34     LVOT peak slade 0.88 m/s    Left Ventricular Outflow Tract Mean Velocity 0.65 cm/s    Left Ventricular Outflow Tract Mean Gradient 1.81 mmHg    RV S' 11.71 cm/s    TAPSE 1.59 cm    RV/LV Ratio 0.89 cm    LA size 2.71 cm    Left Atrium Minor Axis 4.06 cm    Left Atrium Major Axis 3.92 cm    RA Major Axis 4.59 cm    AV mean gradient 2 mmHg    AV peak gradient 4 mmHg    Ao peak slade 0.97 m/s    Ao VTI 17.80 cm    LVOT peak VTI 17.50 cm    AV valve area 4.26 cm²    AV Velocity Ratio 0.91     AV index (prosthetic) 0.98     BRANDAN by Velocity Ratio 3.93 cm²    MV stenosis pressure 1/2 time 80.27 ms    MV valve area p 1/2 method 2.74 cm2    Triscuspid Valve Regurgitation Peak Gradient 32 mmHg    PV PEAK VELOCITY 0.98 m/s    PV peak gradient 4 mmHg    Ao root annulus 3.60 cm    Sinus 3.68 cm    STJ 2.70 cm    Ascending aorta 3.40 cm    IVC diameter 1.82 cm    Mean e' 0.07 m/s    ZLVIDS -1.73     ZLVIDD -2.92     RVDD 3.04 cm    AORTIC VALVE CUSP SEPERATION 2.02 cm    TV resting pulmonary artery pressure 35 mmHg    RV TB RVSP 6 mmHg    Est. RA pres 3 mmHg       Microbiology Results (last 7 days)       ** No results found for the last 168 hours. **            Imaging Results              X-Ray Chest AP Portable (Final result)  Result time 09/03/24 16:48:14      Final result by Nahid Rosa MD (09/03/24 16:48:14)                   Impression:      No  radiographic acute intrathoracic process seen on this single view.      Electronically signed by: Nahid Rosa MD  Date:    09/03/2024  Time:    16:48               Narrative:    EXAMINATION:  XR CHEST AP PORTABLE    CLINICAL HISTORY:  Dizziness and giddiness    TECHNIQUE:  Single frontal view of the chest was performed.    COMPARISON:  None    FINDINGS:  Monitoring leads overlie the chest.  Patient is rotated.    Nonspecific elevation of the right hemidiaphragm.  Mediastinal structures are midline with calcification and tortuosity of the aorta.  Heart is not significantly enlarged.  Pulmonary vasculature and hilar contours are within normal limits.  Bibasilar mild platelike scarring versus atelectasis.  No consolidation, pleural effusion or pneumothorax.                                        CT Head Without Contrast (Final result)  Result time 09/03/24 13:52:24      Final result by Nahid Rosa MD (09/03/24 13:52:24)                   Impression:      1. No acute large vascular territory infarct or intracranial hemorrhage identified.  If persistent neurologic deficit, MRI brain can be obtained.  2. Similar changes of chronic microvascular ischemic disease with cerebral volume loss.  3. Right infratemporal fossa region plaque-like soft tissue density lesion as above, nonspecific.  Sequela of underlying malignancy including squamous cell carcinoma should be excluded.  Further evaluation/follow-up as warranted.  This report was flagged in Epic as containing an incidental finding.      Electronically signed by: Nahid Rosa MD  Date:    09/03/2024  Time:    13:52               Narrative:    EXAMINATION:  CT HEAD WITHOUT CONTRAST    CLINICAL HISTORY:  Neuro deficit, acute, stroke suspected;    TECHNIQUE:  Low dose axial CT images obtained throughout the head without intravenous contrast. Sagittal and coronal reconstructions were performed.    COMPARISON:  Head CT 01/03/2021    FINDINGS:  Intracranial  compartment:    Generalized cerebral volume loss.  Ventricles are midline and similar in overall size and configuration without distortion by mass effect or acute hydrocephalus.  No extra-axial blood or fluid collections.    Gross similar distribution of patchy hypoattenuation of the supratentorial white matter likely sequela of chronic microvascular ischemic change.  No definite new focal areas of abnormal parenchymal attenuation.  No parenchymal mass, hemorrhage, edema or major vascular distribution infarct.  Skull base atherosclerotic vascular calcifications noted.    Skull/extracranial contents (limited evaluation): No fracture.  Suspected small retention cyst versus polyp in the right maxillary sinus.  Mastoid air cells and remaining imaged paranasal sinuses are essentially clear.  Similar degenerative change at the left TMJ.  Suspected remote operative change of the bilateral ocular lenses.  At the posterior aspect of the right infratemporal fossa there is approximate 1.1 x 3.3 cm slightly heterogeneous soft tissue density mass primarily centered at the subcutaneous fat with extension to the overlying skin.  This is more conspicuous and larger from 01/03/2021 study.  No associated internal gas foci or walled-off fluid collection to suggest drainable abscess.                                          Pending Diagnostic Studies:       Procedure Component Value Units Date/Time    MRA Brain without contrast [[id number]] Resulted: 09/03/24 2233    Order Status: Sent Lab Status: No result Updated: 09/03/24 2330    MRA Neck without contrast [[id number]] Resulted: 09/03/24 2233    Order Status: Sent Lab Status: No result Updated: 09/03/24 2335    MRI Brain W WO Contrast [[id number]]     Order Status: Sent Lab Status: No result            Medications:  Reconciled Home Medications:      Medication List        START taking these medications      butalbital-acetaminophen-caffeine -40 mg -40 mg per  tablet  Commonly known as: FIORICET, ESGIC  Take 1 tablet by mouth every 6 (six) hours as needed for Headaches.            CONTINUE taking these medications      acetaminophen 500 MG tablet  Commonly known as: TYLENOL  Take 2 tablets (1,000 mg total) by mouth every 6 (six) hours as needed for Pain.     amLODIPine 5 MG tablet  Commonly known as: NORVASC  Take 5 mg by mouth once daily.     atorvastatin 20 MG tablet  Commonly known as: LIPITOR  Take 20 mg by mouth once daily.     omeprazole 20 MG capsule  Commonly known as: PRILOSEC  Take 20 mg by mouth once daily.            STOP taking these medications      ibuprofen 200 MG tablet  Commonly known as: ADVIL,MOTRIN     metoclopramide HCl 10 MG tablet  Commonly known as: REGLAN     ondansetron 4 MG tablet  Commonly known as: ZOFRAN     ondansetron 4 MG Tbdl  Commonly known as: ZOFRAN-ODT              Indwelling Lines/Drains at time of discharge:   Lines/Drains/Airways       None                   Time spent on the discharge of patient: Greater than 35 minutes         Joanne Mcclure DO  Department of Hospital Medicine  [facility] - Telemetry

## 2024-09-04 NOTE — CONSULTS
Food & Nutrition  Education    Diet Education: heart healthy diet   Time Spent: 15 minutes   Learners: pt       Nutrition Education provided with handouts: Heart Healthy Diet       Comments: Unable to educate pt at this time, pt off unit for cardiology procedure. Will attempt to educate pt at a later date. Attached educations to pt's discharge t.       All questions and concerns answered. Dietitian's contact information provided.       Follow-Up: 1x/weekly    Please Re-consult as needed        Thanks!

## 2024-09-04 NOTE — ASSESSMENT & PLAN NOTE
"Noted on CT: "Right infratemporal fossa region plaque-like soft tissue density lesion as above, nonspecific.  Sequela of underlying malignancy including squamous cell carcinoma should be excluded." MRI brain ordered and will f/u. Will hold on anticoagulation till MRI brain results given symptoms not c/w stroke.   "

## 2024-09-04 NOTE — PROGRESS NOTES
MRI (Courtney) notified nurse, complete MRI could not be done as pt started throwing up and will be sending up pt. Further stated she will be sending the result to Dr Berg, so she could decide from there.      Pt arrived to unit, resting well in bed, denies dizziness, SOB but has headache 7/10.  Pt given IV zofran for vomiting and tylenol for headcahe. Pt resting well, denies any discomfort at this time.  HOB elevated to 30 degree.  Plan of care ongoing.

## 2024-09-04 NOTE — PLAN OF CARE
Problem: Occupational Therapy  Goal: Occupational Therapy Goal  Description: Goals to be met by: 9/18/2024     Patient will increase functional independence with ADLs by performing:    UE Dressing with [place].  LE Dressing with [place].  Grooming while standing at sink with [place].  Toileting from toilet with [place] for hygiene and clothing management.   Step transfer with [place]  Toilet transfer to toilet with [place].    Outcome: Progressing     OT initial eval completed. Pt presenting with generalized weakness/dizziness from baseline. Skilled acute OT to follow. Recommend LIT once medically stable.

## 2024-09-04 NOTE — HOSPITAL COURSE
79 y.o. male with h/o Essential HTN, HLD, BPH, and pre-DM admitted to observation on 09/03/2024 for further evaluation of headaches and dizziness.  Denies any vision changes or focal deficits.  CT head with no acute large vascular territory infarct or intracranial hemorrhage.  However, noted right infratemporal fossa region plaque like soft tissue density lesion.  MRI brain without contrast ordered and showed  No evidence of intracranial metastatic disease, however noting that patient terminated examination prior to completion of axial T1/T2 sequences and postcontrast imaging.Right temporal scalp indeterminate soft tissue lesion. Repeated MRI brain. ENT consulted-recommends outpatient follow up for scalp lesion.     Patient admits feeling significantly better overall.  States the dizziness has resolved, but still has a mild headaches. Pt denies any fever, headaches, vision changes, chest pain, shortness of breath, palpitations, abdominal pain, nausea, vomiting, or any new weaknesses. Feels ready to go home. Patient's exam on discharge was as follow: Patient is alert and oriented, appears in no acute distress, heart with regular rate and rhythm, lungs clear to asculation with non-labored breathing, abdomen soft, and no new weaknesses or focal deficits seen. Bilateral lower extremities without any edema or calf tenderness.     Patient was counseled regarding any abnormal labs, differential diagnosis, treatment options, risk-benefit, lifestyle changes, prognosis, current condition, and medications. Patient was interactive and attentive.  Patient's questions were answered in a respectful and timely manner. Patient was instructed to follow-up with PCP within 1 week and to continue taking medications as prescribed.  Instructed to also follow up with ENT. Also, extensively discussed the risks, benefits, and side effects of patient's medications. Discussed with patient about any medication changes. Patient verbalized  understanding and agrees to treatment plan.  Patient is stable for discharge.  Patient has no other questions or concerns at this time.  ED precautions discussed with the patient.    Vital signs are stable. Ambulating without any difficulty. Tolerating p.o. intake without any nausea or vomiting. Afebrile for over 24 hours. Patient is in stable condition and has no questions or concerns. Patient will be discharge to home once transportation secured.  Ambulatory referral for outpatient PT/OT ordered . Prescriptions sent to pharmacy.  CM/SW to assist with discharge planning.     Vitals:    09/04/24 0642 09/04/24 0800 09/04/24 1108 09/04/24 1137   BP:  (!) 146/80 (!) 143/78    BP Location:  Right arm Right arm    Patient Position:  Lying Lying    Pulse: 74 61 65 65   Resp:  18 18    Temp:  98 °F (36.7 °C) 97.5 °F (36.4 °C)    TempSrc:  Oral Oral    SpO2:  99% 97%    Weight:       Height:

## 2024-09-04 NOTE — PLAN OF CARE
09/04/24 1254   Final Note   Assessment Type Final Discharge Note   Anticipated Discharge Disposition Home   Hospital Resources/Appts/Education Provided Appointments scheduled and added to AVS   Post-Acute Status   Post-Acute Authorization Other   Other Status No Post-Acute Service Needs     Pts nurse Ally notified that the pt can d/c from CM standpoint once seen and cleared by ENT

## 2024-09-04 NOTE — ASSESSMENT & PLAN NOTE
Chronic, controlled. Latest blood pressure and vitals reviewed-     Temp:  [97.5 °F (36.4 °C)-98.2 °F (36.8 °C)]   Pulse:  [58-74]   Resp:  [12-20]   BP: (111-184)/()   SpO2:  [95 %-98 %] .   Home meds for hypertension were reviewed and noted below.   Hypertension Medications               amlodipine (NORVASC) 5 MG tablet Take 5 mg by mouth once daily.            While in the hospital, will manage blood pressure as follows; Adjust home antihypertensive regimen as follows- permissive HTN until MRI brain r/o stroke then resume home med.     Will utilize p.r.n. blood pressure medication only if patient's blood pressure greater than 220/110 and he develops symptoms such as worsening chest pain or shortness of breath.

## 2024-09-04 NOTE — NURSING
[facility], [facility]  Nurses Note -- 4 Eyes      9/4/2024       Skin assessed on: Q Shift      [x] No Pressure Injuries Present    [x]Prevention Measures Documented    [] Yes LDA  for Pressure Injury Previously documented     [] Yes New Pressure Injury Discovered   [] LDA for New Pressure Injury Added      Attending RN:  Ally Teixeira, RN     Second RN:  Lisbet GONZALES RN

## 2024-09-05 NOTE — PROGRESS NOTES
AVS virtually reviewed with patient and daughter ( refused) in its entirety with emphasis on diet, medications, follow-up appointments and reasons to return to the ED or contact the [facility] On Call Nurse Care Line. Patient also encouraged to utilize their patient portal. Ease and convenience of use reiterated. Education complete and patient voiced understanding. All questions answered. Discharge teaching complete.

## 2024-09-05 NOTE — PROGRESS NOTES
[facility], [facility] [place]  Nurses Note -- 4 Eyes      9/4/2024       Skin assessed on: Q Shift      [x] No Pressure Injuries Present    [x]Prevention Measures Documented    [] Yes LDA  for Pressure Injury Previously documented     [] Yes New Pressure Injury Discovered   [] LDA for New Pressure Injury Added      Attending RN:  Lisbet Mckeon RN     Second RN:  Ally Teixeira RN

## 2024-09-05 NOTE — PLAN OF CARE
Problem: Adult Inpatient Plan of Care  Goal: Plan of Care Review  Outcome: Adequate for Care Transition  Goal: Patient-Specific Goal (Individualized)  Outcome: Adequate for Care Transition  Goal: Absence of Hospital-Acquired Illness or Injury  Outcome: Adequate for Care Transition  Goal: Optimal Comfort and Wellbeing  Outcome: Adequate for Care Transition  Goal: Readiness for Transition of Care  Outcome: Adequate for Care Transition     Problem: Stroke, Ischemic (Includes Transient Ischemic Attack)  Goal: Optimal Coping  Outcome: Adequate for Care Transition  Goal: Effective Bowel Elimination  Outcome: Adequate for Care Transition  Goal: Optimal Cerebral Tissue Perfusion  Outcome: Adequate for Care Transition  Goal: Optimal Cognitive Function  Outcome: Adequate for Care Transition  Goal: Improved Communication Skills  Outcome: Adequate for Care Transition  Goal: Optimal Functional Ability  Outcome: Adequate for Care Transition  Goal: Optimal Nutrition Intake  Outcome: Adequate for Care Transition  Goal: Effective Oxygenation and Ventilation  Outcome: Adequate for Care Transition  Goal: Improved Sensorimotor Function  Outcome: Adequate for Care Transition  Goal: Safe and Effective Swallow  Outcome: Adequate for Care Transition  Goal: Effective Urinary Elimination  Outcome: Adequate for Care Transition     Problem: Fall Injury Risk  Goal: Absence of Fall and Fall-Related Injury  Outcome: Adequate for Care Transition

## 2024-09-06 ENCOUNTER — CLINICAL SUPPORT (OUTPATIENT)
Dept: REHABILITATION | Facility: HOSPITAL | Age: 79
End: 2024-09-06
Payer: MEDICARE

## 2024-09-06 DIAGNOSIS — Z78.9 IMPAIRED MOBILITY AND ADLS: Primary | ICD-10-CM

## 2024-09-06 DIAGNOSIS — Z74.09 IMPAIRED MOBILITY AND ADLS: Primary | ICD-10-CM

## 2024-09-06 DIAGNOSIS — R42 DIZZINESS: ICD-10-CM

## 2024-09-06 PROCEDURE — 97165 OT EVAL LOW COMPLEX 30 MIN: CPT | Mod: PN

## 2024-09-06 PROCEDURE — 97530 THERAPEUTIC ACTIVITIES: CPT | Mod: PN

## 2024-09-13 ENCOUNTER — OFFICE VISIT (OUTPATIENT)
Dept: OTOLARYNGOLOGY | Facility: CLINIC | Age: 79
End: 2024-09-13
Payer: MEDICARE

## 2024-09-13 VITALS
DIASTOLIC BLOOD PRESSURE: 66 MMHG | WEIGHT: 128.75 LBS | SYSTOLIC BLOOD PRESSURE: 118 MMHG | HEIGHT: 63 IN | BODY MASS INDEX: 22.81 KG/M2

## 2024-09-13 DIAGNOSIS — L91.0 KELOID: ICD-10-CM

## 2024-09-13 DIAGNOSIS — L98.9 SKIN LESION: Primary | ICD-10-CM

## 2024-09-13 PROCEDURE — 88305 TISSUE EXAM BY PATHOLOGIST: CPT | Mod: 26,,, | Performed by: PATHOLOGY

## 2024-09-13 PROCEDURE — 88305 TISSUE EXAM BY PATHOLOGIST: CPT | Performed by: PATHOLOGY

## 2024-09-13 PROCEDURE — 1101F PT FALLS ASSESS-DOCD LE1/YR: CPT | Mod: CPTII,S$GLB,, | Performed by: OTOLARYNGOLOGY

## 2024-09-13 PROCEDURE — 1125F AMNT PAIN NOTED PAIN PRSNT: CPT | Mod: CPTII,S$GLB,, | Performed by: OTOLARYNGOLOGY

## 2024-09-13 PROCEDURE — 11104 PUNCH BX SKIN SINGLE LESION: CPT | Mod: S$GLB,,, | Performed by: OTOLARYNGOLOGY

## 2024-09-13 PROCEDURE — 99213 OFFICE O/P EST LOW 20 MIN: CPT | Mod: 25,S$GLB,, | Performed by: OTOLARYNGOLOGY

## 2024-09-13 PROCEDURE — 3074F SYST BP LT 130 MM HG: CPT | Mod: CPTII,S$GLB,, | Performed by: OTOLARYNGOLOGY

## 2024-09-13 PROCEDURE — 3078F DIAST BP <80 MM HG: CPT | Mod: CPTII,S$GLB,, | Performed by: OTOLARYNGOLOGY

## 2024-09-13 PROCEDURE — 3288F FALL RISK ASSESSMENT DOCD: CPT | Mod: CPTII,S$GLB,, | Performed by: OTOLARYNGOLOGY

## 2024-09-13 RX ORDER — TRIAMCINOLONE ACETONIDE 1 MG/G
CREAM TOPICAL
COMMUNITY
Start: 2024-02-08 | End: 2025-09-10

## 2024-09-13 RX ORDER — LEVOCETIRIZINE DIHYDROCHLORIDE 5 MG/1
TABLET, FILM COATED ORAL
COMMUNITY
Start: 2024-07-20

## 2024-09-13 RX ORDER — TAMSULOSIN HYDROCHLORIDE 0.4 MG/1
1 CAPSULE ORAL NIGHTLY
COMMUNITY
Start: 2024-08-20

## 2024-09-13 RX ORDER — SIMVASTATIN 40 MG/1
TABLET, FILM COATED ORAL
COMMUNITY
Start: 2024-07-26

## 2024-09-13 NOTE — PROGRESS NOTES
OTOLARYNGOLOGY CLINIC NOTE  Date:  09/13/2024     Chief complaint:  No chief complaint on file.      History of Present Illness  Maggie Bowen is a 79 y.o. male  presenting today for a followup.    I last saw the patient on - - 24. Below text is copied from  note on that date describing history of present illness at that time :      Past Medical History  Past Medical History:   Diagnosis Date    Elevated cholesterol     on medication    Hypertension     Reflux     on medication        Past Surgical History  Past Surgical History:   Procedure Laterality Date    TONGUE BIOPSY  Nov. 2014    (at [facility])        Medications  Current Outpatient Medications on File Prior to Visit   Medication Sig Dispense Refill    acetaminophen (TYLENOL) 500 MG tablet Take 2 tablets (1,000 mg total) by mouth every 6 (six) hours as needed for Pain. 30 tablet 0    amlodipine (NORVASC) 5 MG tablet Take 5 mg by mouth once daily.      atorvastatin (LIPITOR) 20 MG tablet Take 20 mg by mouth once daily.      butalbital-acetaminophen-caffeine -40 mg (FIORICET, ESGIC) -40 mg per tablet Take 1 tablet by mouth every 6 (six) hours as needed for Headaches. 12 tablet 0    omeprazole (PRILOSEC) 20 MG capsule Take 20 mg by mouth once daily.       No current facility-administered medications on file prior to visit.       Review of Systems  ROS     Social History   reports that he has quit smoking. He does not have any smokeless tobacco history on file. He reports that he does not drink alcohol and does not use drugs.     Family History  No family history on file.     Physical Exam   There were no vitals filed for this visit. There is no height or weight on file to calculate BMI.            GENERAL: no acute distress.  HEAD: normocephalic.   EYES: No scleral icterus  EARS: external ear without lesion, normal pinna shape and position.  External auditory canal with normal cerumen, tympanic membrane fully visible, no perforation , no  retraction. No middle ear effusion. Ossicles intact.   NOSE: external nose without significant bony abnormality  ORAL CAVITY/OROPHARYNX: tongue mobile.   NECK: trachea midline.   LYMPH NODES:No cervical lymphadenopathy.  RESPIRATORY: no stridor, no stertor. Voice normal. Respirations nonlabored.  NEURO: alert, responds to questions appropriately.    PSYCH:mood appropriate      Imaging:  The patient {does/does not:66013} have any new imaging of the head and neck since last visit.     Labs:  CBC  Recent Labs   Lab 09/03/24 1939 09/04/24 0457   WBC 8.84 8.21   Hemoglobin 16.8 16.3   Hematocrit 49.0 49.1   MCV 89 91   Platelets 252 225     BMP  Recent Labs   Lab 09/03/24 1939 09/04/24 0457   Glucose 108 98   Sodium 139 140   Potassium 3.7 4.3   Chloride 106 106   CO2 23 26   BUN 9 12   Creatinine 0.8 0.8   Calcium 9.5 9.3   Phosphorus 2.6 L  --    Magnesium 2.3  --      COAGS  Recent Labs   Lab 09/03/24  1358 09/03/24 1939   INR 1.1 1.0       Assessment  There are no diagnoses linked to this encounter.     Plan:  Discussed plan of care with patient in detail and all questions answered. Patient reported understanding of plan of care. I gave the patient the opportunity to ask questions and patient confirmed all questions answered to satisfaction.       F/u ***    I spent a total of *** minutes on the day of the visit.  This includes face to face time and non-face to face time preparing to see the patient (eg, review of tests), obtaining and/or reviewing separately obtained history, documenting clinical information in the electronic or other health record, independently interpreting results and communicating results to the patient/family/caregiver, or care coordinator.   Please be aware that this note has been generated with the assistance of Terri voice-to-text.  Please excuse any spelling or grammatical errors.       Skin lesion  - Specimen to Pathology Dermatology and skin neoplasms    2. Keloid       Plan:  Discussed plan of care with patient in detail and all questions answered. Patient reported understanding of plan of care. I gave the patient the opportunity to ask questions and patient confirmed all questions answered to satisfaction.     Ddx discussed and potential options for tx going forward   Wound care instructions given   Ibuprofen and tylenol for pain   F/u 10-14 days for wound check    I spent a total of 20 minutes on the day of the visit ( additional time to procedure time)  This includes face to face time and non-face to face time preparing to see the patient (eg, review of tests), obtaining and/or reviewing separately obtained history, documenting clinical information in the electronic or other health record, independently interpreting results and communicating results to the patient/family/caregiver, or care coordinator.   Please be aware that this note has been generated with the assistance of Terri voice-to-text.  Please excuse any spelling or grammatical errors.

## 2024-09-19 LAB
FINAL PATHOLOGIC DIAGNOSIS: NORMAL
GROSS: NORMAL
Lab: NORMAL

## 2024-09-23 ENCOUNTER — OFFICE VISIT (OUTPATIENT)
Dept: OTOLARYNGOLOGY | Facility: CLINIC | Age: 79
End: 2024-09-23
Payer: MEDICARE

## 2024-09-23 VITALS
DIASTOLIC BLOOD PRESSURE: 70 MMHG | HEIGHT: 63 IN | SYSTOLIC BLOOD PRESSURE: 122 MMHG | WEIGHT: 128.75 LBS | BODY MASS INDEX: 22.81 KG/M2

## 2024-09-23 DIAGNOSIS — L98.9 SKIN LESION: Primary | ICD-10-CM

## 2024-09-23 PROCEDURE — 99213 OFFICE O/P EST LOW 20 MIN: CPT | Mod: S$GLB,,, | Performed by: OTOLARYNGOLOGY

## 2024-09-23 PROCEDURE — 3074F SYST BP LT 130 MM HG: CPT | Mod: CPTII,S$GLB,, | Performed by: OTOLARYNGOLOGY

## 2024-09-23 PROCEDURE — 3288F FALL RISK ASSESSMENT DOCD: CPT | Mod: CPTII,S$GLB,, | Performed by: OTOLARYNGOLOGY

## 2024-09-23 PROCEDURE — 3078F DIAST BP <80 MM HG: CPT | Mod: CPTII,S$GLB,, | Performed by: OTOLARYNGOLOGY

## 2024-09-23 PROCEDURE — 1159F MED LIST DOCD IN RCRD: CPT | Mod: CPTII,S$GLB,, | Performed by: OTOLARYNGOLOGY

## 2024-09-23 PROCEDURE — 1125F AMNT PAIN NOTED PAIN PRSNT: CPT | Mod: CPTII,S$GLB,, | Performed by: OTOLARYNGOLOGY

## 2024-09-23 PROCEDURE — 1101F PT FALLS ASSESS-DOCD LE1/YR: CPT | Mod: CPTII,S$GLB,, | Performed by: OTOLARYNGOLOGY

## 2024-09-23 NOTE — PROGRESS NOTES
OTOLARYNGOLOGY CLINIC NOTE  Date:  09/23/2024     Chief complaint:  F/u skin biopsy     History of Present Illness  Maggie Bowen is a 79 y.o. male  presenting today for a followup.  Laurel interpretor used   Here with daughter  Had some posterior neck pain that started after biopsy           Past Medical History  Past Medical History:   Diagnosis Date    Elevated cholesterol     on medication    Hypertension     Reflux     on medication        Past Surgical History  Past Surgical History:   Procedure Laterality Date    TONGUE BIOPSY  Nov. 2014    (at [facility])        Medications  Current Outpatient Medications on File Prior to Visit   Medication Sig Dispense Refill    acetaminophen (TYLENOL) 500 MG tablet Take 2 tablets (1,000 mg total) by mouth every 6 (six) hours as needed for Pain. 30 tablet 0    amlodipine (NORVASC) 5 MG tablet Take 5 mg by mouth once daily.      atorvastatin (LIPITOR) 20 MG tablet Take 20 mg by mouth once daily.      butalbital-acetaminophen-caffeine -40 mg (FIORICET, ESGIC) -40 mg per tablet Take 1 tablet by mouth every 6 (six) hours as needed for Headaches. 12 tablet 0    levocetirizine (XYZAL) 5 MG tablet Take by mouth.      omeprazole (PRILOSEC) 20 MG capsule Take 20 mg by mouth once daily.      simvastatin (ZOCOR) 40 MG tablet Take by mouth.      tamsulosin (FLOMAX) 0.4 mg Cap Take 1 capsule by mouth every evening.      triamcinolone acetonide 0.1% (KENALOG) 0.1 % cream Apply to chest scar bid       No current facility-administered medications on file prior to visit.       Review of Systems  Review of Systems   Constitutional: Negative.    HENT:  Positive for hearing loss.    Eyes: Negative.    Cardiovascular: Negative.    Gastrointestinal: Negative.    Genitourinary: Negative.    Skin: Negative.    Neurological:  Positive for headaches.   Psychiatric/Behavioral: Negative.          Social History   reports that he has quit smoking. His smoking use included cigarettes.  "He has never used smokeless tobacco. He reports that he does not drink alcohol and does not use drugs.     Family History  No family history on file.     Physical Exam   Vitals:    09/23/24 1438   BP: 122/70    Body mass index is 22.81 kg/m².  Weight: 58.4 kg (128 lb 12 oz)   Height: 5' 3" (160 cm)     GENERAL: no acute distress.  HEAD: normocephalic.   Keloid with biopsy site healing well   EYES: No scleral icterus  EARS: external ear without lesion, normal pinna shape and position.    NOSE: external nose without significant bony abnormality  ORAL CAVITY/OROPHARYNX: tongue mobile.   NECK: trachea midline.   RESPIRATORY: no stridor, no stertor. Voice normal. Respirations nonlabored.  NEURO: alert, responds to questions appropriately.    PSYCH:mood appropriate      Pathology  1. Cystic nodule, excision:   Epidermal inclusion cyst   Imaging:  The patient does not have any new imaging of the head and neck since last visit.     Labs:  CBC  Recent Labs   Lab 09/03/24 1939 09/04/24  0457   WBC 8.84 8.21   Hemoglobin 16.8 16.3   Hematocrit 49.0 49.1   MCV 89 91   Platelets 252 225     BMP  Recent Labs   Lab 09/03/24 1939 09/04/24  0457   Glucose 108 98   Sodium 139 140   Potassium 3.7 4.3   Chloride 106 106   CO2 23 26   BUN 9 12   Creatinine 0.8 0.8   Calcium 9.5 9.3   Phosphorus 2.6 L  --    Magnesium 2.3  --      COAGS  Recent Labs   Lab 09/03/24  1358 09/03/24 1939   INR 1.1 1.0       Assessment  1. Skin lesion  - CT Soft Tissue Neck With Contrast; Future  - CREATININE, SERUM; Future       Plan:  Discussed plan of care with patient in detail and all questions answered. Patient reported understanding of plan of care. I gave the patient the opportunity to ask questions and patient confirmed all questions answered to satisfaction.     Discussed option of removal vs monitoring. Given change in size compared to prior imaging although path negative for malignancy would like to do follow up exam and imaging to ensure not " changes.   Vaseline to area, cleaned with peroxide   Will get repeat imaging in 2-3 months and recheck but path showed EIC; notify for osoner apptmt   Recommend discuss with pcp about pain in back of neck-do not think related to biopsy  Suspect crawling sesnation and discomfort from keloid , may have some dryness at biopsy site making weird sensation - can do vaseline for this   If any changes notify for immediate apptmt  F/u 2-3 months with imaging      Please be aware that this note has been generated with the assistance of MModal voice-to-text.  Please excuse any spelling or grammatical errors.

## 2024-10-01 ENCOUNTER — CLINICAL SUPPORT (OUTPATIENT)
Dept: REHABILITATION | Facility: HOSPITAL | Age: 79
End: 2024-10-01
Payer: MEDICARE

## 2024-10-01 DIAGNOSIS — Z78.9 IMPAIRED MOBILITY AND ADLS: Primary | ICD-10-CM

## 2024-10-01 DIAGNOSIS — Z74.09 IMPAIRED MOBILITY AND ADLS: Primary | ICD-10-CM

## 2024-10-01 NOTE — PROGRESS NOTES
Occupational Therapy Treatment Note     Date: 10/1/2024  Name: Maggie Bowen  Clinic Number: [id number]    Therapy Diagnosis: No diagnosis found.  Physician: Joanne Mcclure DO       Physician Orders: vestibular therapy  Medical Diagnosis: dizziness   Evaluation Date: 9/6/2024  Plan of Care Expiration Period: 11/01/2024  Insurance Authorization period Expiration: 12/31/24  Date of Return to MD: Sept  Visit # / Visits Authorized: 1 / 20  FOTO: not captured     Time In: 1035  Time Out: 1115  Total Billable (one on one) Time: 40 minutes     Precautions: Standard and Fall    Subjective     Pt reports: doing fine; has family member for translation needs   He was not compliant with home exercise program given last session.   Response to previous treatment:no complaint   Functional change: na    Pain: 0/10  Location: none      Objective          Huu participated in neuromuscular re-education activities to improve: vestibular function for 40 minutes, including:   - VOR x1: 2x30 seconds horizontal   - Participated in static balance activity in Romberg position as follows:   Eyes open on firm surface: x30 seconds   Eyes closed on firm surface: 2x30 seconds  Eyes open on compliant surface: x30 seconds  Eyes closed on compliant surface: 2x30 seconds      Home Exercises and Education Provided      Education provided:   - - Pt education regarding vestibular system anatomy and function, possible causes of vestibulopathy, prognosis of vestibular therapy in addressing deficits   - importance of HEP compliance   - Progress towards goals    Written Home Exercises Provided: Patient instructed to cont prior HEP.  Exercises were reviewed and Huu was able to demonstrate them prior to the end of the session.    Huu demonstrated good  understanding of the education provided.     See EMR under Patient Instructions for exercises provided.    Assessment      Huu would continue to benefit from skilled OT. Good participation in all presented  activities. Opening eyes with eyes closed balance activities, unresolved with verbal cues.      Maggie is progressing well towards his goals and there are no updates to goals at this time. Pt prognosis is Good.     Pt will continue to benefit from skilled outpatient occupational therapy to address the deficits listed in the problem list on initial evaluation provide pt/family education and to maximize pt's level of independence in the home and community environment.     Pt's spiritual, cultural and educational needs considered and pt agreeable to plan of care and goals.    Anticipated barriers to occupational therapy:     Goals:     Short Term Goals = Long Term Goals: 8 weeks     Pt will be independent with Home Exercise Program (HEP) to promote long term maintenance of progress made in therapy.      Pt will participate in oculomotor screener with no onset of symptoms     Pt will improve MCTSIB score to 75/120.     To improve sharpened Romberg eyes closed to 8 seconds.     Pt will verbalize return to walking program to assist in habituation               Goals to be added or modified as necessary.     Plan      Certification Period/Plan of care expiration: 9/6/2024 to 11/01/2024.     Outpatient Occupational Therapy 1 times weekly for 8 weeks to include the following interventions: Neuromuscular Re-ed, Patient Education, Self Care, Therapeutic Activities, and Therapeutic Exercise.    Updates/Grading for next session: walking balance      Capri Draper OT

## 2024-10-01 NOTE — PLAN OF CARE
[facility] Occupational Therapy  Initial Neurological Evaluation     Date: 9/6/2024  Patient: Maggie Bowen  Chart Number: [id number]    Therapy Diagnosis:   Encounter Diagnoses   Name Primary?    Dizziness     Impaired mobility and ADLs Yes     Physician: Joanne Mcclure, DO    Physician Orders: vestibular therapy  Medical Diagnosis: dizziness   Evaluation Date: 9/6/2024  Plan of Care Expiration Period: 11/01/2024  Insurance Authorization period Expiration: 12/31/24  Date of Return to MD: Sept  Visit # / Visits Authorized: 1 / 1  FOTO: not captured    Time In: 1035  Time Out: 1115  Total Billable (one on one) Time: 40 minutes    Precautions: Standard and Fall    Subjective     History of Current Condition: Maggie Bowen is a 79 y.o. male who presents to [facility] for evaluation and treatment secondary to dizziness and imbalance.     Head feels heavy, pressure on head. Still waiting for MRI and cardiology testing. Echoing in both ears. Head hurts.   Denies dizziness today, more of a pressure dizziness. Imbalance noted by daughter. Lives with daughter; lives on main floor with no mobility.   Prior to hospitalization, was walking 30-40 minutes per day at nearby park with friend.      Occupation:  retired  Working presently:   Duties: self care    Functional Limitations/Social History:    Prior Level of Function: independence   Current Level of Function:independence     Home/Living environment : lives w/ family  Home Access: no issues navigating home   DME: -     Leisure: walking    Driving: --    Past Medical History/Physical Systems Review:     Past Medical History:  Maggie Bowen  has a past medical history of Elevated cholesterol, Hypertension, and Reflux.    Past Surgical History:  Maggie Bowen  has a past surgical history that includes Tongue Biopsy (Nov. 2014).    Current Medications:  Maggie has a current medication list which includes the following  prescription(s): acetaminophen, amlodipine, atorvastatin, butalbital-acetaminophen-caffeine -40 mg, and omeprazole.    Allergies:  Review of patient's allergies indicates:   Allergen Reactions    Influenza virus vaccines         Other:     Objective     Cognitive Exam:  Uninterested in eval; DIL providing hx    Oculomotor Exam:  Oculomotor ROM: WNL  Eye Alignment: WNL  Visual Field: WNL  Acuity: WNL    Spontaneous Nystagmus: WNL  Gaze Holding Nystagmus: WNL  Gaze Holding (No Fixation): WNL  Smooth Pursuits: slightly saccadic but within age norms    Horizontal:   Vertical:   Saccades: same pressure in head throughout WNL   Horizontal:   Vertical:   Near Point Convergence (cm):   Accommodation:   Fixation (5 second sustained visual attention):     VOR Slow Head Movement:   Head Thrust:   Dynamic Visual Acuity (DVA):   VOR Cancellation:   Head Shake:   Cover/Cross Cover:  Cover/Uncover:    Physical Exam: WNL posture, cervical, and BUE range of motion. Strength and coordination not tested but no complaints of deficits.       Endurance Deficit: mild    Balance/Functional Mobility Assessment:     Postural control: MCTSIB: Evaluation 09/06/2024    1. Eyes Open/feet together/Firm: 30 seconds   2. Eyes Closed/feet together/Firm:  2 seconds   3. Eyes Open/feet together/Foam:  30 seconds   4. Eyes Closed/feet together/Foam:  0 seconds   TOTAL 62/120     Static Postural Control:   - Sharpened Romberg:    Eyes Open: 30 seconds    Eyes Closed: 3 seconds     Functional Gait Assessment:   NT       Functional Reach:     Modified VAS (Vertebral Artery Screen), in sitting (rotation, then extension):  R:   L:     Positional Canal Testing:   Not indicated    Functional Status      Functional Mobility:  PLOF = CLOF    ADL's:  PLOF = CLOF    IADL's:  PLOF = CLOF    Comments:    CMS Impairment/Limitation/Restriction for FOTO  Survey  Not administered        - Dizziness Handicap Inventory (DHI): nt  16-34= mild   36-53= moderate   >/=  54= severe  - Activities-Specific Balance Confidence Scale (ABC): nt    Treatment     Treatment Time In: 1105  Treatment Time Out: 1115  Total Treatment time separate from Evaluation time:10 minutes     Maggie participated in dynamic functional therapeutic activities to improve functional performance for 10 minutes, including:  - Review of vestibulopathy and how it relates to balance and dizziness  - Review and return demonstration of HEP    Assessment     Maggie Bowen is a 79 y.o. male referred to outpatient occupational therapy and presents with a medical diagnosis of dizziness. Today's evaluation, including subjective history, bedside testing, and balance/mobility testing, align with clinical presentations of abnormal vestibulopathy, unclear of peripheral or central origin. Pt would benefit from OT vestibular therapy to address symptom management and target habituation in order to maximize safe and symptom-free participation in I/ADLs.    Patient prognosis is Fair due to  unknown cause of symptoms, poor pt buy-in.   Patient will benefit from skilled outpatient Occupational Therapy to address the deficits stated above and in the chart below, provide patient/family education, and to maximize patient's level of independence.     Plan of care discussed with patient: Yes  Patient's spiritual, cultural and educational needs considered and patient is agreeable to the plan of care and goals as stated below:     Anticipated Barriers for therapy: pt disinterest    Medical Necessity is demonstrated by the following  Occupational Profile/History  Co-morbidities and personal factors that may impact the plan of care [x] LOW: Brief chart review  [] MODERATE: Expanded chart review   [] HIGH: Extensive chart review    Moderate / High Support Documentation:      Examination  Performance deficits relating to physical, cognitive or psychosocial skills that result in activity limitations and/or participation restrictions  [x] LOW:  addressing 1-3 Performance deficits  [] MODERATE: 3-5 Performance deficits  [] HIGH: 5+ Performance deficits (please support below)    Moderate / High Support Documentation:    Physical:      Cognitive:      Psychosocial:         Treatment Options [x] LOW: Limited options  [] MODERATE: Several options  [] HIGH: Multiple options      Decision Making/ Complexity Score: low         The following goals were discussed with the patient and patient is in agreement with them as to be addressed in the treatment plan.     Patient's Goals for Therapy: decrease dizziness    Goals:     Short Term Goals = Long Term Goals: 8 weeks    Pt will be independent with Home Exercise Program (HEP) to promote long term maintenance of progress made in therapy.     Pt will participate in oculomotor screener with no onset of symptoms    Pt will improve MCTSIB score to 75/120.    To improve sharpened Romberg eyes closed to 8 seconds.    Pt will verbalize return to walking program to assist in habituation           Goals to be added or modified as necessary.    Plan     Certification Period/Plan of care expiration: 9/6/2024 to 11/01/2024.    Outpatient Occupational Therapy 1 times weekly for 8 weeks to include the following interventions: Neuromuscular Re-ed, Patient Education, Self Care, Therapeutic Activities, and Therapeutic Exercise.    Capri Draper OT      I certify the need for these services furnished under this plan of treatment and while under my care.  ____________________________________ Physician/Referring Practitioner   Date of Signature

## 2024-10-08 ENCOUNTER — CLINICAL SUPPORT (OUTPATIENT)
Dept: REHABILITATION | Facility: HOSPITAL | Age: 79
End: 2024-10-08
Payer: MEDICARE

## 2024-10-08 DIAGNOSIS — Z78.9 IMPAIRED MOBILITY AND ADLS: Primary | ICD-10-CM

## 2024-10-08 DIAGNOSIS — Z74.09 IMPAIRED MOBILITY AND ADLS: Primary | ICD-10-CM

## 2024-10-08 NOTE — PROGRESS NOTES
Occupational Therapy Treatment Note     Date: 10/8/2024  Name: Maggie Bowen  Clinic Number: [id number]    Therapy Diagnosis:   Encounter Diagnosis   Name Primary?    Impaired mobility and ADLs Yes     Physician: Joanne Mcclure DO    Physician Orders: vestibular therapy  Medical Diagnosis: dizziness   Evaluation Date: 9/6/2024  Plan of Care Expiration Period: 11/01/2024  Insurance Authorization period Expiration: 12/31/24  Date of Return to MD: September   Visit # / Visits Authorized: 2 / 20  FOTO: not captured     Time In:  1033  Time Out: 1110  Total Billable (one on one) Time: 37 minutes     Precautions: Standard and Fall    Subjective     Pt reports: doing okay; family present for translation needs  He was compliant with home exercise program given last session.   Response to previous treatment:no complaint  Functional change: independence in hep     Pain: 0/10  Location:  none     Objective        Maggie participated in dynamic functional therapeutic activities to improve functional performance for 10  minutes, including:  - Gait Better x10 minutes for improved balance and gait with obstacle navigation, high level of distraction, and functional reach for habituation of symptoms to improve safety with functional ambulation      Huu participated in neuromuscular re-education activities to improve: vestibular function for 27 minutes, including:   - VOR x1: 2x30 seconds horizontal; 85 bpm  - Participated in static balance activity in Romberg position as follows:   Eyes open on firm surface: x30 seconds   Eyes closed on firm surface: 2x30 seconds  Eyes open on compliant surface: x30 seconds  Eyes closed on compliant surface: 2x30 seconds   - Mod tandem: eyes open and eyes closed x30 seconds; min tactile input for eyes closed       Home Exercises and Education Provided      Education provided:   - HEP  - Pt education regarding vestibular system anatomy and function, possible causes of vestibulopathy, prognosis of  vestibular therapy in addressing deficits   - Progress towards goals    Written Home Exercises Provided: Patient instructed to cont prior HEP.  Exercises were reviewed and Maggie was able to demonstrate them prior to the end of the session.    Daveu demonstrated good  understanding of the education provided.     See EMR under Patient Instructions for exercises provided.    Assessment      Maggie would continue to benefit from skilled OT. Good participation in all activities. Engaged and cooperative with GaitBetter.      Maggie is progressing well towards his goals and there are no updates to goals at this time. Pt prognosis is Good.     Pt will continue to benefit from skilled outpatient occupational therapy to address the deficits listed in the problem list on initial evaluation provide pt/family education and to maximize pt's level of independence in the home and community environment.     Pt's spiritual, cultural and educational needs considered and pt agreeable to plan of care and goals.    Anticipated barriers to occupational therapy: na    Goals:     Short Term Goals = Long Term Goals: 8 weeks     Pt will be independent with Home Exercise Program (HEP) to promote long term maintenance of progress made in therapy.      Pt will participate in oculomotor screener with no onset of symptoms     Pt will improve MCTSIB score to 75/120.     To improve sharpened Romberg eyes closed to 8 seconds.     Pt will verbalize return to walking program to assist in habituation               Goals to be added or modified as necessary.     Plan      Certification Period/Plan of care expiration: 9/6/2024 to 11/01/2024.     Outpatient Occupational Therapy 1 times weekly for 8 weeks to include the following interventions: Neuromuscular Re-ed, Patient Education, Self Care, Therapeutic Activities, and Therapeutic Exercise.    Capri Draper, OT

## 2024-10-15 ENCOUNTER — CLINICAL SUPPORT (OUTPATIENT)
Dept: REHABILITATION | Facility: HOSPITAL | Age: 79
End: 2024-10-15
Payer: MEDICARE

## 2024-10-15 DIAGNOSIS — Z78.9 IMPAIRED MOBILITY AND ADLS: Primary | ICD-10-CM

## 2024-10-15 DIAGNOSIS — Z74.09 IMPAIRED MOBILITY AND ADLS: Primary | ICD-10-CM

## 2024-10-15 NOTE — PROGRESS NOTES
Occupational Therapy Treatment Note     Date: 10/15/2024  Name: Maggie Bowen  Clinic Number: [id number]    Therapy Diagnosis:   Encounter Diagnosis   Name Primary?    Impaired mobility and ADLs Yes     Physician: Joanne Mcclure,     Physician Orders: vestibular therapy  Medical Diagnosis: dizziness   Evaluation Date: 9/6/2024  Plan of Care Expiration Period: 11/01/2024  Insurance Authorization period Expiration: 12/31/24  Date of Return to MD: September   Visit # / Visits Authorized: 4 / 20  FOTO: not captured     Time In:  1034  Time Out: 1104  Total Billable (one on one) Time: 30 minutes     Precautions: Standard and Fall    Subjective     Pt reports: doing okay; no dizziness; family member (son) present for interpreting  He was minimally compliant with home exercise program given last session. But is very active in walking program, so incorporating balance strategies that way  Response to previous treatment:no complaint  Functional change: decreased dizziness; back to walking program     Pain: 0/10  Location:  no pain noted     Objective      Maggie participated in dynamic functional therapeutic activities to improve functional performance for 12 minutes, including:  - Gait Better x12 minutes for improved balance and gait with obstacle navigation, high level of distraction, and functional reach for habituation of symptoms to improve safety with functional ambulation  Route: scanning   Speed: 1.3 mph   Navigation of obstacles and junctions: off   Scanning: good; ~80% accuracy       Maggie participated in neuromuscular re-education activities to improve: vestibular function for 18 minutes, including:   - VOR x1: 2x30 seconds horizontal; 90 bpm  - Participated in static balance activity in Romberg position as follows:   Eyes open on firm surface: x30 seconds   Eyes closed on firm surface: 2x30 seconds  Eyes open on compliant surface: x30 seconds  Eyes closed on compliant surface: 2x30 seconds - min tactile input   - Mod  tandem: eyes open and eyes closed x30 seconds; min tactile input for eyes closed       Home Exercises and Education Provided      Education provided:   - HEP   - Progress towards goals    Written Home Exercises Provided: Patient instructed to cont prior HEP.  Exercises were reviewed and Maggie was able to demonstrate them prior to the end of the session.    Maggie demonstrated good  understanding of the education provided.     See EMR under Patient Instructions for exercises provided.    Assessment      Maggie would continue to benefit from skilled OT. Good response to increased metronome. Opening eyes when nervous with static balance. Enthusiastic during gait better. Continue to require verbal cues for instruction, translated by family.      Maggie is progressing well towards his goals and there are no updates to goals at this time. Pt prognosis is Good.     Pt will continue to benefit from skilled outpatient occupational therapy to address the deficits listed in the problem list on initial evaluation provide pt/family education and to maximize pt's level of independence in the home and community environment.     Pt's spiritual, cultural and educational needs considered and pt agreeable to plan of care and goals.    Anticipated barriers to occupational therapy: na    Goals:     Short Term Goals = Long Term Goals: 8 weeks     Pt will be independent with Home Exercise Program (HEP) to promote long term maintenance of progress made in therapy.      Pt will participate in oculomotor screener with no onset of symptoms     Pt will improve MCTSIB score to 75/120.     To improve sharpened Romberg eyes closed to 8 seconds.     Pt will verbalize return to walking program to assist in habituation               Goals to be added or modified as necessary.     Plan      Certification Period/Plan of care expiration: 9/6/2024 to 11/01/2024.     Outpatient Occupational Therapy 1 times weekly for 8 weeks to include the following interventions:  Neuromuscular Re-ed, Patient Education, Self Care, Therapeutic Activities, and Therapeutic Exercise.      Capri Draper, OT

## 2024-10-22 ENCOUNTER — CLINICAL SUPPORT (OUTPATIENT)
Dept: REHABILITATION | Facility: HOSPITAL | Age: 79
End: 2024-10-22
Payer: MEDICARE

## 2024-10-22 DIAGNOSIS — Z74.09 IMPAIRED MOBILITY AND ADLS: Primary | ICD-10-CM

## 2024-10-22 DIAGNOSIS — Z78.9 IMPAIRED MOBILITY AND ADLS: Primary | ICD-10-CM

## 2024-10-22 NOTE — PROGRESS NOTES
Occupational Therapy Treatment Note     Date: 10/22/2024  Name: Maggie Bowen  Clinic Number: [id number]    Therapy Diagnosis:   No diagnosis found.    Physician: Joanne Mcclure DO    Physician Orders: vestibular therapy  Medical Diagnosis: dizziness   Evaluation Date: 9/6/2024  Plan of Care Expiration Period: 11/01/2024  Insurance Authorization period Expiration: 12/31/24  Date of Return to MD: September   Visit # / Visits Authorized: 5 / 20  FOTO: not captured     Time In:  1034  Time Out: 1104  Total Billable (one on one) Time: 30 minutes     Precautions: Standard and Fall    Subjective     Pt reports: doing okay; no dizziness; family member (son) present for interpreting  He was minimally compliant with home exercise program given last session. But is very active in walking program, so incorporating balance strategies that way  Response to previous treatment:no complaint  Functional change: decreased dizziness; back to walking program     Pain: 0/10  Location:  no pain noted     Objective      Maggie participated in dynamic functional therapeutic activities to improve functional performance for 12 minutes, including:  - Gait Better x12 minutes for improved balance and gait with obstacle navigation, high level of distraction, and functional reach for habituation of symptoms to improve safety with functional ambulation  Route: scanning   Speed: 1.3 mph   Navigation of obstacles and junctions: off   Scanning: good; ~80% accuracy       Maggie participated in neuromuscular re-education activities to improve: vestibular function for 18 minutes, including:   - VOR x1: 2x30 seconds horizontal; 90 bpm  - Participated in static balance activity in Romberg position as follows:   Eyes open on firm surface: x30 seconds   Eyes closed on firm surface: 2x30 seconds  Eyes open on compliant surface: x30 seconds  Eyes closed on compliant surface: 2x30 seconds - min tactile input   - Mod tandem: eyes open and eyes closed x30 seconds; min  tactile input for eyes closed       Home Exercises and Education Provided      Education provided:   - HEP   - Progress towards goals    Written Home Exercises Provided: Patient instructed to cont prior HEP.  Exercises were reviewed and Maggie was able to demonstrate them prior to the end of the session.    Maggie demonstrated good  understanding of the education provided.     See EMR under Patient Instructions for exercises provided.    Assessment      Maggie would continue to benefit from skilled OT. Good response to increased metronome. Opening eyes when nervous with static balance. Enthusiastic during gait better. Continue to require verbal cues for instruction, translated by family.      Maggie is progressing well towards his goals and there are no updates to goals at this time. Pt prognosis is Good.     Pt will continue to benefit from skilled outpatient occupational therapy to address the deficits listed in the problem list on initial evaluation provide pt/family education and to maximize pt's level of independence in the home and community environment.     Pt's spiritual, cultural and educational needs considered and pt agreeable to plan of care and goals.    Anticipated barriers to occupational therapy: na    Goals:    Short Term Goals = Long Term Goals: 8 weeks     Pt will be independent with Home Exercise Program (HEP) to promote long term maintenance of progress made in therapy.      Pt will participate in oculomotor screener with no onset of symptoms  met   Pt will improve MCTSIB score to 75/120.     To improve sharpened Romberg eyes closed to 8 seconds.     Pt will verbalize return to walking program to assist in habituation               Goals to be added or modified as necessary.    Plan          Certification Period/Plan of care expiration: 9/6/2024 to 11/01/2024.     Outpatient Occupational Therapy 1 times weekly for 8 weeks to include the following interventions: Neuromuscular Re-ed, Patient Education, Self  Care, Therapeutic Activities, and Therapeutic Exercise.  Updates/Grading for next session: hussein Draper, OT

## 2024-11-04 ENCOUNTER — LAB VISIT (OUTPATIENT)
Dept: LAB | Facility: HOSPITAL | Age: 79
End: 2024-11-04
Attending: OTOLARYNGOLOGY
Payer: MEDICARE

## 2024-11-04 DIAGNOSIS — L98.9 SKIN LESION: ICD-10-CM

## 2024-11-04 LAB
CREAT SERPL-MCNC: 0.8 MG/DL (ref 0.5–1.4)
EST. GFR  (NO RACE VARIABLE): >60 ML/MIN/1.73 M^2

## 2024-11-04 PROCEDURE — 82565 ASSAY OF CREATININE: CPT | Performed by: OTOLARYNGOLOGY

## 2024-11-04 PROCEDURE — 36415 COLL VENOUS BLD VENIPUNCTURE: CPT | Performed by: OTOLARYNGOLOGY

## 2024-11-18 ENCOUNTER — HOSPITAL ENCOUNTER (OUTPATIENT)
Dept: RADIOLOGY | Facility: HOSPITAL | Age: 79
Discharge: HOME | End: 2024-11-18
Attending: OTOLARYNGOLOGY
Payer: MEDICARE

## 2024-11-18 DIAGNOSIS — L98.9 SKIN LESION: ICD-10-CM

## 2024-11-18 PROCEDURE — 70491 CT SOFT TISSUE NECK W/DYE: CPT | Mod: TC

## 2024-11-18 PROCEDURE — 70491 CT SOFT TISSUE NECK W/DYE: CPT | Mod: 26,,, | Performed by: RADIOLOGY

## 2024-11-18 PROCEDURE — 25500020 PHARM REV CODE 255: Performed by: OTOLARYNGOLOGY

## 2024-11-18 RX ADMIN — IOHEXOL 75 ML: 350 INJECTION, SOLUTION INTRAVENOUS at 09:11

## 2024-11-20 ENCOUNTER — TELEPHONE (OUTPATIENT)
Dept: OTOLARYNGOLOGY | Facility: CLINIC | Age: 79
End: 2024-11-20
Payer: MEDICARE

## 2024-11-20 NOTE — TELEPHONE ENCOUNTER
----- Message from Marcin sent at 11/20/2024 12:37 PM CST -----  Type:  Test Results    Who Called: cara Scott's daughter  Name of Test (Lab/Mammo/Etc): ct scan  Date of Test: 11-  Ordering Provider: JASON ELIZABETH  Where the test was performed: [facility]  Would the patient rather a call back or a response via [facility]? Call back   Best Call Back Number:  [phone]   Additional Information:  no

## 2024-11-20 NOTE — TELEPHONE ENCOUNTER
Spoke with daughter and explained to her that dr landry is out the office until right before patients appt with her on 12/6. Will print out the results and address about it when she is back in office. Will call them back if any care needs to be changed.

## 2024-12-06 ENCOUNTER — OFFICE VISIT (OUTPATIENT)
Dept: OTOLARYNGOLOGY | Facility: CLINIC | Age: 79
End: 2024-12-06
Payer: MEDICARE

## 2024-12-06 VITALS
DIASTOLIC BLOOD PRESSURE: 78 MMHG | HEIGHT: 63 IN | WEIGHT: 128.75 LBS | BODY MASS INDEX: 22.81 KG/M2 | SYSTOLIC BLOOD PRESSURE: 132 MMHG

## 2024-12-06 DIAGNOSIS — L98.9 SKIN LESION: ICD-10-CM

## 2024-12-06 DIAGNOSIS — L91.0 KELOID: ICD-10-CM

## 2024-12-06 DIAGNOSIS — R91.8 ABNORMAL CT SCAN OF LUNG: Primary | ICD-10-CM

## 2024-12-06 PROCEDURE — 3075F SYST BP GE 130 - 139MM HG: CPT | Mod: CPTII,S$GLB,, | Performed by: OTOLARYNGOLOGY

## 2024-12-06 PROCEDURE — 1159F MED LIST DOCD IN RCRD: CPT | Mod: CPTII,S$GLB,, | Performed by: OTOLARYNGOLOGY

## 2024-12-06 PROCEDURE — 3288F FALL RISK ASSESSMENT DOCD: CPT | Mod: CPTII,S$GLB,, | Performed by: OTOLARYNGOLOGY

## 2024-12-06 PROCEDURE — 1101F PT FALLS ASSESS-DOCD LE1/YR: CPT | Mod: CPTII,S$GLB,, | Performed by: OTOLARYNGOLOGY

## 2024-12-06 PROCEDURE — 3078F DIAST BP <80 MM HG: CPT | Mod: CPTII,S$GLB,, | Performed by: OTOLARYNGOLOGY

## 2024-12-06 PROCEDURE — 99214 OFFICE O/P EST MOD 30 MIN: CPT | Mod: S$GLB,,, | Performed by: OTOLARYNGOLOGY

## 2024-12-06 PROCEDURE — 1125F AMNT PAIN NOTED PAIN PRSNT: CPT | Mod: CPTII,S$GLB,, | Performed by: OTOLARYNGOLOGY

## 2024-12-06 NOTE — PROGRESS NOTES
"  OTOLARYNGOLOGY CLINIC NOTE  Date:  12/06/2024     Chief complaint:  Chief Complaint   Patient presents with    skin lesion     Ct results       History of Present Illness  Maggie Bowen is a 79 y.o. male  presenting today for a followup.  No cough or choke with swalowing no hemoptysis weight stable   Here with family member tranlsating   [place] [facility] [place] about "lump on right scalp and referred to derm )  He is having back pain and inquired about if he can get pain meds    I last saw the patient on 9-23 - 24. Below text is copied from  note on that date describing history of present illness at that time :    Laurel interpretor used   Here with daughter  Had some posterior neck pain that started after biopsy         Past Medical History  Past Medical History:   Diagnosis Date    Elevated cholesterol     on medication    Hypertension     Reflux     on medication        Past Surgical History  Past Surgical History:   Procedure Laterality Date    TONGUE BIOPSY  Nov. 2014    (at [facility])        Medications  Current Outpatient Medications on File Prior to Visit   Medication Sig Dispense Refill    acetaminophen (TYLENOL) 500 MG tablet Take 2 tablets (1,000 mg total) by mouth every 6 (six) hours as needed for Pain. 30 tablet 0    amlodipine (NORVASC) 5 MG tablet Take 5 mg by mouth once daily.      atorvastatin (LIPITOR) 20 MG tablet Take 20 mg by mouth once daily.      butalbital-acetaminophen-caffeine -40 mg (FIORICET, ESGIC) -40 mg per tablet Take 1 tablet by mouth every 6 (six) hours as needed for Headaches. 12 tablet 0    levocetirizine (XYZAL) 5 MG tablet Take by mouth.      omeprazole (PRILOSEC) 20 MG capsule Take 20 mg by mouth once daily.      simvastatin (ZOCOR) 40 MG tablet Take by mouth.      tamsulosin (FLOMAX) 0.4 mg Cap Take 1 capsule by mouth every evening.      triamcinolone acetonide 0.1% (KENALOG) 0.1 % cream Apply to chest scar bid       No current " "facility-administered medications on file prior to visit.       Review of Systems  Review of Systems   Constitutional:  Positive for chills.   HENT:  Positive for hearing loss.    Eyes:  Positive for photophobia.   Cardiovascular: Negative.    Gastrointestinal: Negative.    Genitourinary: Negative.    Musculoskeletal:  Positive for back pain.   Skin: Negative.    Neurological: Negative.    Psychiatric/Behavioral: Negative.          Social History   reports that he has quit smoking. His smoking use included cigarettes. He has never used smokeless tobacco. He reports that he does not drink alcohol and does not use drugs.     Family History  No family history on file.     Physical Exam   There were no vitals filed for this visit. There is no height or weight on file to calculate BMI.            GENERAL: no acute distress.  HEAD: normocephalic. Keloid type appearance of right temple, biopsy site well healed     EYES: No scleral icterus  EARS: external ear without lesion, normal pinna shape and position.  External auditory canal with normal cerumen, tympanic membrane fully visible, no perforation , no retraction. No middle ear effusion. Ossicles intact.   NOSE: external nose without significant bony abnormality  ORAL CAVITY/OROPHARYNX: tongue mobile.   NECK: trachea midline.   LYMPH NODES:No cervical lymphadenopathy.  RESPIRATORY: no stridor, no stertor. Voice normal. Respirations nonlabored.  NEURO: alert, responds to questions appropriately.    PSYCH:mood appropriate      Imaging:  The patient does have any new imaging of the head and neck since last visit.   Ct images and report personally reviewed and reviewed with patient. Radiology report in quotations below  " There is a homogeneous soft tissue lesion within the right temporal soft tissues which corresponds to lesion seen on prior MRI brain please note this is only partially included in the study measuring approximately 3.6 by 1.0 in AP by TV dimensions image 6 " "series 2 with the craniocaudal extent of 2.1 cm image 51 series 602 however this.  Most portion is excluded from the field of view visualized portions relatively similar to prior.  This is overall remains nonspecific further follow-up evaluation with MRI brain imaging may be helpful to assess the entirety of lesion and compared to prior small lobular opacity right maxillary antra suggestive for mucous retention cyst.  No significant opacification mastoid air cells.     Ill-defined opacity in the right upper lobe with bronchiectasis suggestive for scarring and atelectasis. "    Does not appear to have enlarged from September to november        Labs:  CBC  Recent Labs   Lab 09/03/24 1939 09/04/24 0457   WBC 8.84 8.21   Hemoglobin 16.8 16.3   Hematocrit 49.0 49.1   MCV 89 91   Platelets 252 225     BMP  Recent Labs   Lab 09/03/24 1939 09/04/24 0457 11/04/24  0843   Glucose 108 98  --    Sodium 139 140  --    Potassium 3.7 4.3  --    Chloride 106 106  --    CO2 23 26  --    BUN 9 12  --    Creatinine 0.8 0.8 0.8   Calcium 9.5 9.3  --    Phosphorus 2.6 L  --   --    Magnesium 2.3  --   --      COAGS  Recent Labs   Lab 09/03/24  1358 09/03/24 1939   INR 1.1 1.0       Assessment  1. Abnormal CT scan of lung  - Ambulatory referral/consult to Pulmonology; Future    2. Keloid  - MRI Brain Pituitary W WO Contrast; Future  - CREATININE, SERUM; Future    3. Skin lesion  - MRI Brain Pituitary W WO Contrast; Future  - CREATININE, SERUM; Future       Plan:  Discussed plan of care with patient in detail and all questions answered. Patient reported understanding of plan of care. I gave the patient the opportunity to ask questions and patient confirmed all questions answered to satisfaction.     Daughter to contact pcp to see if have record of derm name on referral from December 2019 that I was able to find after extensive search in epic however I am unable to find a name of the dermatologist. Would like to get records to ensure " whatever was removed was not a basal cell cancer   History of tobacco abuse and abnormal finding in chest on ct neck - suspect will need dedicated ct chest but will defer ordering now in case if pulm wants to wait a period and then repeat    Repeat mri brain in feb-(brain captured entire area last time- would like thinner cuts so ordered pituittary) march with f/u after     F/u march     I spent a total of 31 minutes on the day of the visit.  This includes face to face time and non-face to face time preparing to see the patient (eg, review of tests), obtaining and/or reviewing separately obtained history, documenting clinical information in the electronic or other health record, independently interpreting results and communicating results to the patient/family/caregiver, or care coordinator.   Please be aware that this note has been generated with the assistance of Terri voice-to-text.  Please excuse any spelling or grammatical errors.

## 2025-01-07 ENCOUNTER — OFFICE VISIT (OUTPATIENT)
Dept: PULMONOLOGY | Facility: CLINIC | Age: 80
End: 2025-01-07
Payer: MEDICARE

## 2025-01-07 VITALS
HEART RATE: 67 BPM | WEIGHT: 140.19 LBS | SYSTOLIC BLOOD PRESSURE: 121 MMHG | DIASTOLIC BLOOD PRESSURE: 79 MMHG | HEIGHT: 63 IN | BODY MASS INDEX: 24.84 KG/M2 | OXYGEN SATURATION: 96 %

## 2025-01-07 DIAGNOSIS — R91.8 ABNORMAL CT SCAN OF LUNG: ICD-10-CM

## 2025-01-07 DIAGNOSIS — J47.1 BRONCHIECTASIS WITH (ACUTE) EXACERBATION: Primary | ICD-10-CM

## 2025-01-07 DIAGNOSIS — J47.1 BRONCHIECTASIS WITH ACUTE EXACERBATION: ICD-10-CM

## 2025-01-07 DIAGNOSIS — Z22.7 LATENT TUBERCULOSIS: Primary | ICD-10-CM

## 2025-01-07 PROCEDURE — 1101F PT FALLS ASSESS-DOCD LE1/YR: CPT | Mod: CPTII,S$GLB,, | Performed by: INTERNAL MEDICINE

## 2025-01-07 PROCEDURE — 3078F DIAST BP <80 MM HG: CPT | Mod: CPTII,S$GLB,, | Performed by: INTERNAL MEDICINE

## 2025-01-07 PROCEDURE — 1159F MED LIST DOCD IN RCRD: CPT | Mod: CPTII,S$GLB,, | Performed by: INTERNAL MEDICINE

## 2025-01-07 PROCEDURE — 99203 OFFICE O/P NEW LOW 30 MIN: CPT | Mod: S$GLB,,, | Performed by: INTERNAL MEDICINE

## 2025-01-07 PROCEDURE — 1126F AMNT PAIN NOTED NONE PRSNT: CPT | Mod: CPTII,S$GLB,, | Performed by: INTERNAL MEDICINE

## 2025-01-07 PROCEDURE — 99999 PR PBB SHADOW E&M-EST. PATIENT-LVL IV: CPT | Mod: PBBFAC,,, | Performed by: INTERNAL MEDICINE

## 2025-01-07 PROCEDURE — 3288F FALL RISK ASSESSMENT DOCD: CPT | Mod: CPTII,S$GLB,, | Performed by: INTERNAL MEDICINE

## 2025-01-07 PROCEDURE — 3074F SYST BP LT 130 MM HG: CPT | Mod: CPTII,S$GLB,, | Performed by: INTERNAL MEDICINE

## 2025-01-07 PROCEDURE — 1160F RVW MEDS BY RX/DR IN RCRD: CPT | Mod: CPTII,S$GLB,, | Performed by: INTERNAL MEDICINE

## 2025-01-07 NOTE — PROGRESS NOTES
General Pulmonary Clinic  New Patient Visit    Formal  declined, daughter in law served as  per request    Chief Complaint: focal bronchiectasis     HPI     Maggie Bowen is a 80 y.o. male with a history of brugada syndrome type I, HTN, HLD, soft tissue temporal mass possible keloid presenting with chief complaint of abnormal CT w/ RUL bronchiectasis    # abnormal chest imaging with RUL scarring and atelectasis  CT soft tissue neck 11/18/24 obtained chest portion personally interpreted  RUL focal bronchiectasis w/ associated scarring and atelectasis    He denies shortness of breath. He sometimes has a dry cough, very rare. Denies wheezing.  Denies GERD, difficult swallowing. May have had pneumonia 20 years ago. 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 No mold or water damage    Records reviewed with the following findings ---  9/4/23 .         Objective   Past History     Past Medical History:  Past Medical History:   Diagnosis Date    Elevated cholesterol     on medication    Hypertension     Reflux     on medication         Past Surgical History:  Past Surgical History:   Procedure Laterality Date    TONGUE BIOPSY  Nov. 2014    (at [facility])         Social History:   Social History     Socioeconomic History    Marital status:    Tobacco Use    Smoking status: Former     Types: Cigarettes    Smokeless tobacco: Never   Substance and Sexual Activity    Alcohol use: No    Drug use: No     Social Drivers of Health     Financial Resource Strain: Low Risk  (9/20/2024)    Overall Financial Resource Strain (CARDIA)     Difficulty of Paying Living Expenses: Not hard at all   Food Insecurity: No Food Insecurity (9/20/2024)    Hunger Vital Sign     Worried About Running Out of Food in the Last Year: Never true     Ran Out of Food in the Last Year: Never  true   Transportation Needs: No Transportation Needs (9/5/2024)    Received from [facility]    PRAPARE - Transportation     Lack of Transportation (Medical): No     Lack of Transportation (Non-Medical): No   Physical Activity: Insufficiently Active (9/20/2024)    Exercise Vital Sign     Days of Exercise per Week: 7 days     Minutes of Exercise per Session: 10 min   Stress: No Stress Concern Present (9/20/2024)    [employer] [place] [employer] - Occupational Stress Questionnaire     Feeling of Stress : Not at all   Housing Stability: Unknown (9/20/2024)    Housing Stability Vital Sign     Unable to Pay for Housing in the Last Year: No         Family Hx:  No family history of pulmonary fibrosis, pre-mature graying of hair, cirrhosis, or hematologic malignancies  No family history of emphysema or spontaneous PTX  no family history of lung cancer or lymphoma    Allergies and Pertinent Medications   Allergies and Medications Reviewed    Influenza virus vaccines  Current Outpatient Medications on File Prior to Visit   Medication Sig Dispense Refill    acetaminophen (TYLENOL) 500 MG tablet Take 2 tablets (1,000 mg total) by mouth every 6 (six) hours as needed for Pain. 30 tablet 0    amlodipine (NORVASC) 5 MG tablet Take 5 mg by mouth once daily.      atorvastatin (LIPITOR) 20 MG tablet Take 20 mg by mouth once daily.      butalbital-acetaminophen-caffeine -40 mg (FIORICET, ESGIC) -40 mg per tablet Take 1 tablet by mouth every 6 (six) hours as needed for Headaches. 12 tablet 0    levocetirizine (XYZAL) 5 MG tablet Take by mouth.      omeprazole (PRILOSEC) 20 MG capsule Take 20 mg by mouth once daily.      simvastatin (ZOCOR) 40 MG tablet Take by mouth.      tamsulosin (FLOMAX) 0.4 mg Cap Take 1 capsule by mouth every evening.      triamcinolone acetonide 0.1% (KENALOG) 0.1 % cream Apply to chest scar bid       No current facility-administered medications on file prior to visit.              Physical  "Exam   /79   Pulse 67   Ht 5' 3" (1.6 m)   Wt 63.6 kg (140 lb 3.4 oz)   SpO2 96%   BMI 24.84 kg/m²       Constitutional: No acute distress, Atraumatic   HEENT: moist mucus membranes, extraocular movements intact  Cardiovascular: regular rate and rhythm, no murmurs, rubs or gallops  Pulmonary: normal respiratory rate and chest rise, no chest wall deformity, normal breath sounds with no wheezing or crackles  Abdominal: non-distended, bowel sounds present  Musculoskeletal: No lower extremity edema, no clubbing  Neurological: normal speech/fior, moves all extremities against gravity  Skin: no finger cyanosis, no rashes on exposed body parts  Psych: Appropriate affect, normal mood       Diagnostic Studies      All diagnostic studies relevant to chief complaint reviewed personally    Labs:  Lab Results   Component Value Date    WBC 8.21 09/04/2024    HGB 16.3 09/04/2024     09/04/2024    MCV 91 09/04/2024     Lab Results   Component Value Date     09/04/2024    K 4.3 09/04/2024    CO2 26 09/04/2024    BUN 12 09/04/2024    CREATININE 0.8 11/04/2024    MG 2.3 09/03/2024     No results found for: "AST", "ALT", "ALBUMIN", "PROT", "BILITOT"      PFTs:  Pulmonary Functions Testing Results:  No results found for: "FEV1", "FVC", "AIA9COD", "TLC", "DLCO"      FVC FEV1 FEV/FVC TLC DLCO 6MWT Interpret                                                         TTE:  Results for orders placed during the hospital encounter of 09/03/24    Echo Saline Bubble? Yes    Interpretation Summary    Left Ventricle: The left ventricle is normal in size. There is normal systolic function with a visually estimated ejection fraction of 55 - 60%. Grade I diastolic dysfunction.    Right Ventricle: Systolic function is normal.    Left Atrium: Agitated saline study of the atrial septum is negative after vasalva maneuver, suggesting absence of intracardiac shunt at the atrial level.    Pulmonary Artery: The estimated pulmonary " artery systolic pressure is 35 mmHg.    IVC/SVC: Normal venous pressure at 3 mmHg.            Assessment and Plan   Maggie Bowen is a 80 y.o. male  with a history of brugada syndrome type I, HTN, HLD, soft tissue temporal mass possible keloid presenting with chief complaint of abnormal CT w/ RUL bronchiectasis    Problem List:  # focal RUL bronchiectasis w/ focal opacity - unknown chronicity - likely represents sequelae of prior pneumonia/infection including possibility of tuberculosis. Will plan for formal CT chest to follow up on opacity and assess extent of bronchiectasis. Largely asymptomatic but can consider airway clearance if needed  # tobacco smoke exposure - chronic, stable - former heavy smoker, out of guideline for lung cancer screening. Offered pft for assessment of airway disease, declined as asymptomatic  # possible TB exposure - chronic, stable - findings on CT may be consistent w/ TB and from an endemic area. Plan for quant-tb gold however they will likely not pursue treatment if positive per daughter      Differential, diagnoses, diagnostic work up, and possible treatments were discussed with the patient. Questions were answered.    Dalila Collins MD  Pulmonary-Critical Care Medicine              For this visit, the following time was spent  preparing to see the patient (e.g., review of tests) 10 minutes  obtaining and/or reviewing separately obtained history 0 minutes  Performing a medically necessary appropriate examination and/or evaluation 12 minutes  Counseling and educating the patient/family/caregiver 12 minutes  Ordering medications, tests, or procedures 3 minutes  Referring and communicating with other health care professionals (when not reported separately) 0minutes  Documenting clinical information in the electronic or other health record 5minutes  Care coordination (not reported separately) 0minutes    Total time = 42 minutes

## 2025-01-08 ENCOUNTER — HOSPITAL ENCOUNTER (OUTPATIENT)
Dept: RADIOLOGY | Facility: HOSPITAL | Age: 80
Discharge: HOME | End: 2025-01-08
Attending: INTERNAL MEDICINE
Payer: MEDICARE

## 2025-01-08 DIAGNOSIS — J47.1 BRONCHIECTASIS WITH (ACUTE) EXACERBATION: ICD-10-CM

## 2025-01-08 PROCEDURE — 71250 CT THORAX DX C-: CPT | Mod: TC

## 2025-01-08 PROCEDURE — 71250 CT THORAX DX C-: CPT | Mod: 26,,, | Performed by: RADIOLOGY

## 2025-01-09 ENCOUNTER — PATIENT MESSAGE (OUTPATIENT)
Dept: PULMONOLOGY | Facility: CLINIC | Age: 80
End: 2025-01-09
Payer: MEDICARE

## 2025-01-09 PROBLEM — Z22.7 LATENT TUBERCULOSIS: Status: ACTIVE | Noted: 2025-01-09

## 2025-01-13 ENCOUNTER — PATIENT MESSAGE (OUTPATIENT)
Dept: PULMONOLOGY | Facility: CLINIC | Age: 80
End: 2025-01-13
Payer: MEDICARE

## 2025-01-14 ENCOUNTER — OFFICE VISIT (OUTPATIENT)
Dept: PULMONOLOGY | Facility: CLINIC | Age: 80
End: 2025-01-14
Payer: MEDICARE

## 2025-01-14 ENCOUNTER — TELEPHONE (OUTPATIENT)
Dept: PULMONOLOGY | Facility: CLINIC | Age: 80
End: 2025-01-14
Payer: MEDICARE

## 2025-01-14 DIAGNOSIS — J47.1 BRONCHIECTASIS WITH (ACUTE) EXACERBATION: Primary | ICD-10-CM

## 2025-01-14 PROCEDURE — 98004 SYNCH AUDIO-VIDEO EST SF 10: CPT | Mod: 95,,, | Performed by: INTERNAL MEDICINE

## 2025-01-14 PROCEDURE — 1160F RVW MEDS BY RX/DR IN RCRD: CPT | Mod: CPTII,95,, | Performed by: INTERNAL MEDICINE

## 2025-01-14 PROCEDURE — 1159F MED LIST DOCD IN RCRD: CPT | Mod: CPTII,95,, | Performed by: INTERNAL MEDICINE

## 2025-01-14 NOTE — PROGRESS NOTES
General Pulmonary Clinic  Follow Up Patient Visit    Formal  declined, daughter in law served as  per request    Audio Only Telehealth Visit     The patient location is: [place]  The chief complaint leading to consultation is: bronchiectasis  Visit type: Virtual visit with audio only (telephone)  Total time spent with patient: 10     The reason for the audio only service rather than synchronous audio and video virtual visit was related to technical difficulties or patient preference/necessity.     Each patient to whom I provide medical services by telemedicine is:  (1) informed of the relationship between the physician and patient and the respective role of any other health care provider with respect to management of the patient; and (2) notified that they may decline to receive medical services by telemedicine and may withdraw from such care at any time. Patient verbally consented to receive this service via voice-only telephone call.             This service was not originating from a related E/M service provided within the previous 7 days nor will  to an E/M service or procedure within the next 24 hours or my soonest available appointment.  Prevailing standard of care was able to be met in this audio-only visit.       Chief Complaint: focal bronchiectasis     HPI     Maggie Bowen is a 80 y.o. male with a history of brugada syndrome type I, HTN, HLD, soft tissue temporal mass possible keloid presenting with chief complaint of  RUL bronchiectasis    Interval hx:  CT chest with stable RUL bbronchiectasis w/ bronchiolitis, RML bronchitolitis, lower lobe subpleural intersitital thickening  Quant TB gold +  Denies shortness of breath or cough would like to discuss latent TB tx w/ ID    Presenting HPI  # abnormal chest imaging with RUL scarring and atelectasis  CT soft tissue neck 11/18/24 obtained chest portion personally interpreted  RUL focal bronchiectasis w/ associated scarring and  atelectasis    He denies shortness of breath. He sometimes has a dry cough, very rare. Denies wheezing.  Denies GERD, difficult swallowing. May have had pneumonia 20 years ago. 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 No mold or water damage    Records reviewed with the following findings ---  9/4/23 .         Objective   Past History     Past Medical History:  Past Medical History:   Diagnosis Date    Elevated cholesterol     on medication    Hypertension     Reflux     on medication         Past Surgical History:  Past Surgical History:   Procedure Laterality Date    TONGUE BIOPSY  Nov. 2014    (at [facility])         Social History:   Social History     Socioeconomic History    Marital status:    Tobacco Use    Smoking status: Former     Types: Cigarettes    Smokeless tobacco: Never   Substance and Sexual Activity    Alcohol use: No    Drug use: No     Social Drivers of Health     Financial Resource Strain: Low Risk  (9/20/2024)    Overall Financial Resource Strain (CARDIA)     Difficulty of Paying Living Expenses: Not hard at all   Food Insecurity: No Food Insecurity (9/20/2024)    Hunger Vital Sign     Worried About Running Out of Food in the Last Year: Never true     Ran Out of Food in the Last Year: Never true   Transportation Needs: No Transportation Needs (9/5/2024)    Received from [facility]    PRAPARE - Transportation     Lack of Transportation (Medical): No     Lack of Transportation (Non-Medical): No   Physical Activity: Insufficiently Active (9/20/2024)    Exercise Vital Sign     Days of Exercise per Week: 7 days     Minutes of Exercise per Session: 10 min   Stress: No Stress Concern Present (9/20/2024)    [employer] [place] [employer] - Occupational Stress Questionnaire     Feeling of Stress : Not at all   Housing Stability: Unknown  "(9/20/2024)    Housing Stability Vital Sign     Unable to Pay for Housing in the Last Year: No         Family Hx:  No family history of pulmonary fibrosis, pre-mature graying of hair, cirrhosis, or hematologic malignancies  No family history of emphysema or spontaneous PTX  no family history of lung cancer or lymphoma    Allergies and Pertinent Medications   Allergies and Medications Reviewed    Influenza virus vaccines  Current Outpatient Medications on File Prior to Visit   Medication Sig Dispense Refill    acetaminophen (TYLENOL) 500 MG tablet Take 2 tablets (1,000 mg total) by mouth every 6 (six) hours as needed for Pain. 30 tablet 0    amlodipine (NORVASC) 5 MG tablet Take 5 mg by mouth once daily.      atorvastatin (LIPITOR) 20 MG tablet Take 20 mg by mouth once daily.      butalbital-acetaminophen-caffeine -40 mg (FIORICET, ESGIC) -40 mg per tablet Take 1 tablet by mouth every 6 (six) hours as needed for Headaches. 12 tablet 0    levocetirizine (XYZAL) 5 MG tablet Take by mouth.      omeprazole (PRILOSEC) 20 MG capsule Take 20 mg by mouth once daily.      simvastatin (ZOCOR) 40 MG tablet Take by mouth.      tamsulosin (FLOMAX) 0.4 mg Cap Take 1 capsule by mouth every evening.      triamcinolone acetonide 0.1% (KENALOG) 0.1 % cream Apply to chest scar bid       No current facility-administered medications on file prior to visit.              Physical Exam   There were no vitals taken for this visit.      Audio telemedicine only       Diagnostic Studies      All diagnostic studies relevant to chief complaint reviewed personally    Labs:  Lab Results   Component Value Date    WBC 8.21 09/04/2024    HGB 16.3 09/04/2024     09/04/2024    MCV 91 09/04/2024     Lab Results   Component Value Date     09/04/2024    K 4.3 09/04/2024    CO2 26 09/04/2024    BUN 12 09/04/2024    CREATININE 1.0 01/08/2025    MG 2.3 09/03/2024     No results found for: "AST", "ALT", "ALBUMIN", "PROT", " ""BILITOT"      PFTs:  Pulmonary Functions Testing Results:  No results found for: "FEV1", "FVC", "EMT6XVI", "TLC", "DLCO"      FVC FEV1 FEV/FVC TLC DLCO 6MWT Interpret                                                         TTE:  Results for orders placed during the hospital encounter of 09/03/24    Echo Saline Bubble? Yes    Interpretation Summary    Left Ventricle: The left ventricle is normal in size. There is normal systolic function with a visually estimated ejection fraction of 55 - 60%. Grade I diastolic dysfunction.    Right Ventricle: Systolic function is normal.    Left Atrium: Agitated saline study of the atrial septum is negative after vasalva maneuver, suggesting absence of intracardiac shunt at the atrial level.    Pulmonary Artery: The estimated pulmonary artery systolic pressure is 35 mmHg.    IVC/SVC: Normal venous pressure at 3 mmHg.            Assessment and Plan   Maggie Bowen is a 80 y.o. male  with a history of brugada syndrome type I, HTN, HLD, soft tissue temporal mass possible keloid presenting with chief complaint of abnormal CT w/ RUL bronchiectasis    Problem List:  # focal RUL bronchiectasis w/ focal opacity - unknown chronicity - likely represents sequelae of prior pneumonia/infection including possibility of tuberculosis Largely asymptomatic but can consider airway clearance if needed  # interstitial findings - likely chronic, stable - no respiratory symptoms will continue to monitor  # latent TB  - chronic, stable - findings on CT may be consistent w/ TB and from an endemic area. + quant TB gold -- has ID appt  # tobacco smoke exposure - chronic, stable - former heavy smoker, out of guideline for lung cancer screening. Offered pft for assessment of airway disease, declined as asymptomatic      Differential, diagnoses, diagnostic work up, and possible treatments were discussed with the patient. Questions were answered.    6 month follow up    Dalila Collins MD  Pulmonary-Critical Care " Medicine

## 2025-01-14 NOTE — TELEPHONE ENCOUNTER
----- Message from Med Assistant Idget sent at 1/14/2025  7:54 AM CST -----  sorry we miss the appointment today. i was stuck in traffic and can't make it to the appointment. i try to reschedule the appointment again but not until after March. are there any earlier appointments available? thank you

## 2025-02-12 ENCOUNTER — OFFICE VISIT (OUTPATIENT)
Dept: INFECTIOUS DISEASES | Facility: CLINIC | Age: 80
End: 2025-02-12
Payer: MEDICARE

## 2025-02-12 VITALS
DIASTOLIC BLOOD PRESSURE: 71 MMHG | SYSTOLIC BLOOD PRESSURE: 109 MMHG | BODY MASS INDEX: 24.38 KG/M2 | OXYGEN SATURATION: 97 % | WEIGHT: 137.56 LBS | HEART RATE: 74 BPM | HEIGHT: 63 IN

## 2025-02-12 DIAGNOSIS — Z22.7 LATENT TUBERCULOSIS: Primary | ICD-10-CM

## 2025-02-12 PROCEDURE — 99999 PR PBB SHADOW E&M-EST. PATIENT-LVL III: CPT | Mod: PBBFAC,,,

## 2025-02-12 PROCEDURE — 1126F AMNT PAIN NOTED NONE PRSNT: CPT | Mod: CPTII,S$GLB,, | Performed by: INTERNAL MEDICINE

## 2025-02-12 PROCEDURE — 3074F SYST BP LT 130 MM HG: CPT | Mod: CPTII,S$GLB,, | Performed by: INTERNAL MEDICINE

## 2025-02-12 PROCEDURE — 1159F MED LIST DOCD IN RCRD: CPT | Mod: CPTII,S$GLB,, | Performed by: INTERNAL MEDICINE

## 2025-02-12 PROCEDURE — 99214 OFFICE O/P EST MOD 30 MIN: CPT | Mod: S$GLB,,, | Performed by: INTERNAL MEDICINE

## 2025-02-12 PROCEDURE — 3078F DIAST BP <80 MM HG: CPT | Mod: CPTII,S$GLB,, | Performed by: INTERNAL MEDICINE

## 2025-02-12 NOTE — PROGRESS NOTES
Infectious Disease Clinic Note  02/12/2025       Subjective:       Patient ID: Maggie Bowen is a 80 y.o. male being seen for an new visit.    Chief Complaint: No chief complaint on file.    HPI  79y/o M pt with hx of brugada syndrome type I, HTN, HLD who was referred by pulm for latent TB.    He follows with pulm for RUL bronchiectasis. Quant gold positive on 1/8.    Has a cough at night. Says it started recently- does not remember. Daughter in law concerned pt has early dementia. No fevers, chills, night sweats, or  hemoptysis reported.     Pt and family do not want to undergo treatment for latent TB. She and pt do not want to treat for latent TB.    1/8/25 CT chest: Small area of interstitial thickening with bronchiectasis in the right upper lobe, as above, grossly similar to the 09/03/2024 chest radiograph. No acute findings/ consolidation identified.     Social hx:  He is a former smoker 1 ppd x 40 years, quit 20-30 years ago  Worked at [employer] here.    Originally from [place]. Has lived in the [place]. Here since 1997.  No known hx of TB or prior treatment.     No family history on file.  Social History     Socioeconomic History    Marital status:    Tobacco Use    Smoking status: Former     Types: Cigarettes    Smokeless tobacco: Never   Substance and Sexual Activity    Alcohol use: No    Drug use: No     Social Drivers of Health     Financial Resource Strain: Low Risk  (2/11/2025)    Overall Financial Resource Strain (CARDIA)     Difficulty of Paying Living Expenses: Not hard at all   Food Insecurity: No Food Insecurity (2/11/2025)    Hunger Vital Sign     Worried About Running Out of Food in the Last Year: Never true     Ran Out of Food in the Last Year: Never true   Transportation Needs: No Transportation Needs (2/11/2025)    PRAPARE - Transportation     Lack of Transportation (Medical): No     Lack of Transportation (Non-Medical): No   Physical Activity: Inactive (2/11/2025)    Exercise  Vital Sign     Days of Exercise per Week: 0 days     Minutes of Exercise per Session: 10 min   Stress: No Stress Concern Present (2/11/2025)    [employer] - Occupational Stress Questionnaire     Feeling of Stress : Not at all   Housing Stability: Low Risk  (2/11/2025)    Housing Stability Vital Sign     Unable to Pay for Housing in the Last Year: No     Number of Times Moved in the Last Year: 0     Homeless in the Last Year: No     Past Surgical History:   Procedure Laterality Date    TONGUE BIOPSY  Nov. 2014    (at [facility])       Patient's Medications   New Prescriptions    No medications on file   Previous Medications    ACETAMINOPHEN (TYLENOL) 500 MG TABLET    Take 2 tablets (1,000 mg total) by mouth every 6 (six) hours as needed for Pain.    AMLODIPINE (NORVASC) 5 MG TABLET    Take 5 mg by mouth once daily.    ATORVASTATIN (LIPITOR) 20 MG TABLET    Take 20 mg by mouth once daily.    BUTALBITAL-ACETAMINOPHEN-CAFFEINE -40 MG (FIORICET, ESGIC) -40 MG PER TABLET    Take 1 tablet by mouth every 6 (six) hours as needed for Headaches.    LEVOCETIRIZINE (XYZAL) 5 MG TABLET    Take by mouth.    OMEPRAZOLE (PRILOSEC) 20 MG CAPSULE    Take 20 mg by mouth once daily.    SIMVASTATIN (ZOCOR) 40 MG TABLET    Take by mouth.    TAMSULOSIN (FLOMAX) 0.4 MG CAP    Take 1 capsule by mouth every evening.    TRIAMCINOLONE ACETONIDE 0.1% (KENALOG) 0.1 % CREAM    Apply to chest scar bid   Modified Medications    No medications on file   Discontinued Medications    No medications on file       Patient Active Problem List    Diagnosis Date Noted    Latent tuberculosis 01/09/2025    Impaired mobility and ADLs 09/06/2024    Abnormal CT scan of head 09/04/2024    Essential hypertension 09/04/2024    Pre-diabetes 09/04/2024    Other hyperlipidemia 09/04/2024    Dizziness 09/03/2024    Hemangioma of skin and subcutaneous tissue 12/16/2014    Tongue mass 12/16/2014       Review of Systems  "  Review of Systems   Constitutional:  Negative for chills, diaphoresis, fever, malaise/fatigue and weight loss.   Respiratory:  Positive for cough. Negative for hemoptysis, sputum production, shortness of breath and wheezing.    Cardiovascular:  Negative for chest pain and leg swelling.   Skin:  Negative for rash.   All other systems reviewed and are negative.          Objective:      /71   Pulse 74   Ht 5' 3" (1.6 m)   Wt 62.4 kg (137 lb 9.1 oz)   SpO2 97%   BMI 24.37 kg/m²   Estimated body mass index is 24.37 kg/m² as calculated from the following:    Height as of this encounter: 5' 3" (1.6 m).    Weight as of this encounter: 62.4 kg (137 lb 9.1 oz).    Physical Exam  Vitals and nursing note reviewed.   Constitutional:       Appearance: Normal appearance. He is not ill-appearing.   HENT:      Head: Normocephalic and atraumatic.      Nose: Nose normal. No congestion.      Mouth/Throat:      Mouth: Mucous membranes are moist.      Pharynx: Oropharynx is clear.   Eyes:      Conjunctiva/sclera: Conjunctivae normal.      Pupils: Pupils are equal, round, and reactive to light.   Cardiovascular:      Rate and Rhythm: Normal rate and regular rhythm.   Pulmonary:      Effort: Pulmonary effort is normal. No respiratory distress.      Breath sounds: Normal breath sounds.   Abdominal:      General: Abdomen is flat. There is no distension.      Palpations: Abdomen is soft.   Musculoskeletal:         General: No swelling. Normal range of motion.      Cervical back: Normal range of motion and neck supple.   Skin:     General: Skin is warm and dry.   Neurological:      General: No focal deficit present.      Mental Status: He is alert and oriented to person, place, and time.   Psychiatric:         Mood and Affect: Mood normal.         Behavior: Behavior normal.         Assessment:         1. Latent tuberculosis              Plan:       Diagnoses and all orders for this visit:    Latent tuberculosis  81y/o M with hx of " bronchiectasis referred for positive quantiferon gold. Has a recent abnormal CT chest 1/8/25 with RUL bronchiectasis similar to CXR from 9/2024.   Do not suspect active pulmonary TB.   Discussed treatment options or latent TB  Pt and family opting not to treat given age/ dementia.   Counseled on signs and symptoms of active pulm TB and advised to notify me if these were to arise as would then need additional workup and treatment.     Dry cough  Unclear duration of cough  Discussed reflux and post nasal drip as most common causes of chronic cough as well as prevention.  If cough worsens or becomes productive would send for respi cx/ afb cx to eval for NTM  Continue to f/u with pulm    RTC PRN  Melyssa Branch MD  Infectious Disease     Total professional time spent for the encounter: 30 minutes  Time was spent preparing to see the patient, reviewing results of prior testing, obtaining and/or reviewing separately obtained history, performing a medically appropriate examination and interview, counseling and educating the patient/family, ordering medications/tests/procedures, referring and communicating with other health care professionals, documenting clinical information in the electronic health record, and independently interpreting results.

## 2025-04-13 DIAGNOSIS — L91.0 KELOID: Primary | ICD-10-CM

## 2025-04-13 DIAGNOSIS — L98.9 SKIN LESION: ICD-10-CM

## 2025-04-15 ENCOUNTER — HOSPITAL ENCOUNTER (OUTPATIENT)
Dept: RADIOLOGY | Facility: HOSPITAL | Age: 80
Discharge: HOME | End: 2025-04-15
Attending: OTOLARYNGOLOGY
Payer: MEDICARE

## 2025-04-15 DIAGNOSIS — L98.9 SKIN LESION: ICD-10-CM

## 2025-04-15 DIAGNOSIS — L91.0 KELOID: ICD-10-CM

## 2025-04-15 PROCEDURE — 70553 MRI BRAIN STEM W/O & W/DYE: CPT | Mod: TC

## 2025-04-15 PROCEDURE — A9585 GADOBUTROL INJECTION: HCPCS | Performed by: OTOLARYNGOLOGY

## 2025-04-15 PROCEDURE — 70553 MRI BRAIN STEM W/O & W/DYE: CPT | Mod: 26,,, | Performed by: RADIOLOGY

## 2025-04-15 PROCEDURE — 25500020 PHARM REV CODE 255: Performed by: OTOLARYNGOLOGY

## 2025-04-15 RX ORDER — GADOBUTROL 604.72 MG/ML
6.5 INJECTION INTRAVENOUS
Status: COMPLETED | OUTPATIENT
Start: 2025-04-15 | End: 2025-04-15

## 2025-04-15 RX ADMIN — GADOBUTROL 6.5 ML: 604.72 INJECTION INTRAVENOUS at 09:04
